# Patient Record
Sex: FEMALE | Race: ASIAN | NOT HISPANIC OR LATINO | Employment: FULL TIME | URBAN - METROPOLITAN AREA
[De-identification: names, ages, dates, MRNs, and addresses within clinical notes are randomized per-mention and may not be internally consistent; named-entity substitution may affect disease eponyms.]

---

## 2016-10-03 LAB
HBV SURFACE AG SERPL QL IA: POSITIVE
HIV 1+2 AB+HIV1 P24 AG SERPL QL IA: NONREACTIVE
RUBELLA ANTIBODY IGG QUANTITATIVE: NORMAL IU/ML
T PALLIDUM IGG SER QL: NONREACTIVE

## 2017-04-03 LAB — GROUP B STREP PCR: NEGATIVE

## 2017-04-28 ENCOUNTER — HOSPITAL ENCOUNTER (OUTPATIENT)
Facility: CLINIC | Age: 37
Discharge: HOME | End: 2017-04-28
Attending: OBSTETRICS & GYNECOLOGY | Admitting: OBSTETRICS & GYNECOLOGY
Payer: COMMERCIAL

## 2017-04-28 VITALS
HEIGHT: 57 IN | SYSTOLIC BLOOD PRESSURE: 115 MMHG | WEIGHT: 141 LBS | BODY MASS INDEX: 30.42 KG/M2 | RESPIRATION RATE: 16 BRPM | TEMPERATURE: 98.1 F | DIASTOLIC BLOOD PRESSURE: 79 MMHG

## 2017-04-28 PROBLEM — Z36.89 ENCOUNTER FOR TRIAGE IN PREGNANT PATIENT: Status: ACTIVE | Noted: 2017-04-28

## 2017-04-28 PROCEDURE — 99213 OFFICE O/P EST LOW 20 MIN: CPT

## 2017-04-28 PROCEDURE — 99214 OFFICE O/P EST MOD 30 MIN: CPT

## 2017-04-28 RX ORDER — PRENATAL VIT/IRON FUM/FOLIC AC 27MG-0.8MG
1 TABLET ORAL DAILY
COMMUNITY

## 2017-04-28 NOTE — PROVIDER NOTIFICATION
04/28/17 0858   Provider Notification   Provider Name/Title Dr Kulkarni   Method of Notification In Department   Notification Reason SVE;Status Update   early labor, orders to d/c home

## 2017-04-28 NOTE — PROVIDER NOTIFICATION
04/28/17 0829   Provider Notification   Provider Name/Title Dr Kulkarni   Method of Notification In Department   viewing strip

## 2017-04-28 NOTE — PROVIDER NOTIFICATION
04/28/17 0716   Provider Notification   Provider Name/Title Dr. Kulkarni   Method of Notification Phone   Request Evaluate - Remote   Notification Reason Patient Arrived;Uterine Activity;Pain;Bleeding;Lab/Diagnostic Study;SVE;Other (Comment)   Notified Dr. Kulkarni of pt arrival, c/o, uterine activity, SVE, pt description of pain, hepatitis B positive.  Orders received for pt to ambulate for one hour and recheck cervix.

## 2017-04-28 NOTE — PLAN OF CARE
"At 0634,  at 39 1/7 weeks presented to L&D with c/o contractions q 15 minutes beginning this morning and red bleeding when she wiped x 1 at 0400, with no bleeding since.  Pt denies LOF.  Pt's primary language is Cambodian.  Pt declines telephone  at this time, but states that she would desire bedside  if she is admitted for labor.  EFM applied and history reviewed.    History significant for Hepatitis B.  Per prenatal record, viral load is low.    Irregular contractions observed, mild to moderate to palpation.  Pt describes some contractions as feeling \"tight\", and others as feeling painful.  Cervix 1/80/-1, scant brown discharge on glove with exam.  No fluid or other bleeding observed.    Plan to update MD for further orders.  "

## 2017-04-28 NOTE — PROGRESS NOTES
Data: [facility] at 0600.   Reason for maternal/fetal assessment per patient is Vaginal Bleeding and Contractions  . Patient is a . Prenatal record reviewed.      Obstetric History       T0      TAB0   SAB0   E0   M0   L0       # Outcome Date GA Lbr Hernan/2nd Weight Sex Delivery Anes PTL Lv   1 Current                  Medical History:   Past Medical History:   Diagnosis Date     Hepatitis B      Horseshoe kidney    . Gestational Age 39w1d. VSS. Cervix: /-1.  Fetal movement present. Patient denies cramping, backache, vaginal discharge, pelvic pressure, UTI symptoms, GI problems, edema, headache, visual disturbances, epigastric or URQ pain, abdominal pain, rupture of membranes. Support persons present.  Action: Verbal consent for EFM. Triage assessment completed. EFM applied for fetal well being. Uterine assessment performed. Fetal assessment: Presumed adequate fetal oxygenation documented (see flow record).  Patient instructed to report change in fetal movement, vaginal leaking of fluid or bleeding, abdominal pain, or any concerns related to the pregnancy to her nurse/physician.   Response: Dr. Kulkarni informed of caden alonso. Plan per provider is discharge home. Patient verbalized understanding of education and verbalized agreement with plan. Discharged ambulatory at 0915.

## 2017-04-28 NOTE — DISCHARGE INSTRUCTIONS
Discharge Instruction for Undelivered Patients      You were seen for: Labor Assessment  We Consulted: Dr Kuklarni  You had (Test or Medicine):sve and fetal monitoring     Diet:   Drink 8 to 12 glasses of liquids (milk, juice, water) every day.  You may eat meals and snacks.     Activity:  Call your doctor or nurse midwife if your baby is moving less than usual.     Call your provider if you notice:  Swelling in your face or increased swelling in your hands or legs.  Headaches that are not relieved by Tylenol (acetaminophen).  Changes in your vision (blurring: seeing spots or stars.)  Nausea (sick to your stomach) and vomiting (throwing up).   Weight gain of 5 pounds or more per week.  Heartburn that doesn't go away.  Signs of bladder infection: pain when you urinate (use the toilet), need to go more often and more urgently.  The bag of alvarez (rupture of membranes) breaks, or you notice leaking in your underwear.  Bright red blood in your underwear.  Abdominal (lower belly) or stomach pain.  For first baby: Contractions (tightening) less than 5 minutes apart for one hour or more.  Increase or change in vaginal discharge (note the color and amount)      Follow-up:  As scheduled in the clinic

## 2017-04-28 NOTE — IP AVS SNAPSHOT
MRN:[id number]                      After Visit Summary   4/28/2017    Magaly Jensen    MRN: [id number]           Thank you!     Thank you for choosing [facility] for your care. Our goal is always to provide you with excellent care. Hearing back from our patients is one way we can continue to improve our services. Please take a few minutes to complete the written survey that you may receive in the mail after you visit. If you would like to speak to someone directly about your visit please contact Patient Relations at [phone]. Thank you!          Patient Information     Date Of Birth          1980        About your hospital stay     You were admitted on:  April 28, 2017 You last received care in the:  [facility] Labor and Delivery    You were discharged on:  April 28, 2017       Who to Call     For medical emergencies, please call 911.  For non-urgent questions about your medical care, please call your primary care provider or clinic, None          Attending Provider     Provider Specialty    Gabriela Damico MD OB/Gyn    Olivia Kulkarni,  OB/Gyn       Primary Care Provider    None Specified       No primary provider on file.        Further instructions from your care team       Discharge Instruction for Undelivered Patients      You were seen for: Labor Assessment  We Consulted: Dr Kulkarni  You had (Test or Medicine):sve and fetal monitoring     Diet:   Drink 8 to 12 glasses of liquids (milk, juice, water) every day.  You may eat meals and snacks.     Activity:  Call your doctor or nurse midwife if your baby is moving less than usual.     Call your provider if you notice:  Swelling in your face or increased swelling in your hands or legs.  Headaches that are not relieved by Tylenol (acetaminophen).  Changes in your vision (blurring: seeing spots or stars.)  Nausea (sick to your stomach) and vomiting (throwing up).   Weight gain of 5 pounds or more per  "week.  Heartburn that doesn't go away.  Signs of bladder infection: pain when you urinate (use the toilet), need to go more often and more urgently.  The bag of alvarez (rupture of membranes) breaks, or you notice leaking in your underwear.  Bright red blood in your underwear.  Abdominal (lower belly) or stomach pain.  For first baby: Contractions (tightening) less than 5 minutes apart for one hour or more.  Increase or change in vaginal discharge (note the color and amount)      Follow-up:  As scheduled in the clinic          Pending Results     No orders found from 2017 to 2017.            Admission Information     Date & Time Provider Department Dept. Phone    2017 Olivia Kulkarni,  [facility] Labor and Delivery [phone]      Your Vitals Were     Blood Pressure Temperature Respirations Height Weight BMI (Body Mass Index)    115/79 98.1  F (36.7  C) (Oral) 16 1.448 m (4' 9\") 64 kg (141 lb) 30.51 kg/m2      MyChart Information     [employer] lets you send messages to your doctor, view your test results, renew your prescriptions, schedule appointments and more. To sign up, go to [email or web address] . Click on \"Log in\" on the left side of the screen, which will take you to the Welcome page. Then click on \"Sign up Now\" on the right side of the page.     You will be asked to enter the access code listed below, as well as some personal information. Please follow the directions to create your username and password.     Your access code is: [email or web address]  Expires: 2017  8:56 AM     Your access code will  in 90 days. If you need help or a new code, please call your [place] clinic or [phone].        Care EveryWhere ID     This is your Care EveryWhere ID. This could be used by other organizations to access your [place] medical records  [id number]           Review of your medicines      UNREVIEWED medicines. Ask your doctor about these medicines        Dose / Directions    " prenatal multivitamin  plus iron 27-0.8 MG Tabs per tablet        Dose:  1 tablet   Take 1 tablet by mouth daily   Refills:  0                Protect others around you: Learn how to safely use, store and throw away your medicines at www.disposemymeds.org.             Medication List: This is a list of all your medications and when to take them. Check marks below indicate your daily home schedule. Keep this list as a reference.      Medications           Morning Afternoon Evening Bedtime As Needed    prenatal multivitamin  plus iron 27-0.8 MG Tabs per tablet   Take 1 tablet by mouth daily

## 2017-04-29 ENCOUNTER — HOSPITAL ENCOUNTER (OUTPATIENT)
Facility: CLINIC | Age: 37
Discharge: HOME | End: 2017-04-29
Attending: OBSTETRICS & GYNECOLOGY | Admitting: OBSTETRICS & GYNECOLOGY
Payer: COMMERCIAL

## 2017-04-29 ENCOUNTER — HOSPITAL ENCOUNTER (INPATIENT)
Facility: CLINIC | Age: 37
LOS: 2 days | Discharge: HOME | End: 2017-05-01
Attending: OBSTETRICS & GYNECOLOGY | Admitting: OBSTETRICS & GYNECOLOGY
Payer: COMMERCIAL

## 2017-04-29 VITALS — RESPIRATION RATE: 16 BRPM | DIASTOLIC BLOOD PRESSURE: 76 MMHG | TEMPERATURE: 98.3 F | SYSTOLIC BLOOD PRESSURE: 131 MMHG

## 2017-04-29 LAB
ABO + RH BLD: NORMAL
ABO + RH BLD: NORMAL
SPECIMEN EXP DATE BLD: NORMAL

## 2017-04-29 PROCEDURE — 25000125 ZZHC RX 250: Performed by: OBSTETRICS & GYNECOLOGY

## 2017-04-29 PROCEDURE — 12000029 ZZH R&B OB INTERMEDIATE

## 2017-04-29 PROCEDURE — 86900 BLOOD TYPING SEROLOGIC ABO: CPT | Performed by: OBSTETRICS & GYNECOLOGY

## 2017-04-29 PROCEDURE — 86901 BLOOD TYPING SEROLOGIC RH(D): CPT | Performed by: OBSTETRICS & GYNECOLOGY

## 2017-04-29 PROCEDURE — 25000132 ZZH RX MED GY IP 250 OP 250 PS 637: Performed by: OBSTETRICS & GYNECOLOGY

## 2017-04-29 PROCEDURE — 86780 TREPONEMA PALLIDUM: CPT | Performed by: OBSTETRICS & GYNECOLOGY

## 2017-04-29 PROCEDURE — 0KQM0ZZ REPAIR PERINEUM MUSCLE, OPEN APPROACH: ICD-10-PCS | Performed by: OBSTETRICS & GYNECOLOGY

## 2017-04-29 PROCEDURE — 36415 COLL VENOUS BLD VENIPUNCTURE: CPT | Performed by: OBSTETRICS & GYNECOLOGY

## 2017-04-29 PROCEDURE — 25800025 ZZH RX 258: Performed by: OBSTETRICS & GYNECOLOGY

## 2017-04-29 PROCEDURE — 72200001 ZZH LABOR CARE VAGINAL DELIVERY SINGLE

## 2017-04-29 RX ORDER — NALOXONE HYDROCHLORIDE 0.4 MG/ML
.1-.4 INJECTION, SOLUTION INTRAMUSCULAR; INTRAVENOUS; SUBCUTANEOUS
Status: DISCONTINUED | OUTPATIENT
Start: 2017-04-29 | End: 2017-04-30 | Stop reason: CLARIF

## 2017-04-29 RX ORDER — OXYTOCIN/0.9 % SODIUM CHLORIDE 30/500 ML
100-340 PLASTIC BAG, INJECTION (ML) INTRAVENOUS CONTINUOUS PRN
Status: COMPLETED | OUTPATIENT
Start: 2017-04-29 | End: 2017-04-29

## 2017-04-29 RX ORDER — METHYLERGONOVINE MALEATE 0.2 MG/ML
200 INJECTION INTRAVENOUS
Status: DISCONTINUED | OUTPATIENT
Start: 2017-04-29 | End: 2017-04-30 | Stop reason: CLARIF

## 2017-04-29 RX ORDER — IBUPROFEN 400 MG/1
400-800 TABLET, FILM COATED ORAL EVERY 6 HOURS PRN
Status: DISCONTINUED | OUTPATIENT
Start: 2017-04-29 | End: 2017-05-01 | Stop reason: HOSPADM

## 2017-04-29 RX ORDER — FENTANYL CITRATE 50 UG/ML
50-100 INJECTION, SOLUTION INTRAMUSCULAR; INTRAVENOUS
Status: DISCONTINUED | OUTPATIENT
Start: 2017-04-29 | End: 2017-04-30 | Stop reason: CLARIF

## 2017-04-29 RX ORDER — ONDANSETRON 2 MG/ML
4 INJECTION INTRAMUSCULAR; INTRAVENOUS EVERY 6 HOURS PRN
Status: DISCONTINUED | OUTPATIENT
Start: 2017-04-29 | End: 2017-04-30 | Stop reason: CLARIF

## 2017-04-29 RX ORDER — MISOPROSTOL 200 UG/1
400 TABLET ORAL
Status: DISCONTINUED | OUTPATIENT
Start: 2017-04-29 | End: 2017-05-01 | Stop reason: HOSPADM

## 2017-04-29 RX ORDER — IBUPROFEN 800 MG/1
800 TABLET, FILM COATED ORAL
Status: COMPLETED | OUTPATIENT
Start: 2017-04-29 | End: 2017-04-29

## 2017-04-29 RX ORDER — OXYCODONE AND ACETAMINOPHEN 5; 325 MG/1; MG/1
1 TABLET ORAL
Status: DISCONTINUED | OUTPATIENT
Start: 2017-04-29 | End: 2017-04-30 | Stop reason: CLARIF

## 2017-04-29 RX ORDER — AMOXICILLIN 250 MG
1-2 CAPSULE ORAL 2 TIMES DAILY
Status: DISCONTINUED | OUTPATIENT
Start: 2017-04-29 | End: 2017-05-01 | Stop reason: HOSPADM

## 2017-04-29 RX ORDER — OXYTOCIN/0.9 % SODIUM CHLORIDE 30/500 ML
100 PLASTIC BAG, INJECTION (ML) INTRAVENOUS CONTINUOUS
Status: DISCONTINUED | OUTPATIENT
Start: 2017-04-29 | End: 2017-05-01 | Stop reason: HOSPADM

## 2017-04-29 RX ORDER — CARBOPROST TROMETHAMINE 250 UG/ML
250 INJECTION, SOLUTION INTRAMUSCULAR
Status: DISCONTINUED | OUTPATIENT
Start: 2017-04-29 | End: 2017-04-30 | Stop reason: CLARIF

## 2017-04-29 RX ORDER — LANOLIN 100 %
OINTMENT (GRAM) TOPICAL
Status: DISCONTINUED | OUTPATIENT
Start: 2017-04-29 | End: 2017-05-01 | Stop reason: HOSPADM

## 2017-04-29 RX ORDER — HYDROCORTISONE 2.5 %
CREAM (GRAM) TOPICAL 3 TIMES DAILY PRN
Status: DISCONTINUED | OUTPATIENT
Start: 2017-04-29 | End: 2017-05-01 | Stop reason: HOSPADM

## 2017-04-29 RX ORDER — OXYTOCIN 10 [USP'U]/ML
10 INJECTION, SOLUTION INTRAMUSCULAR; INTRAVENOUS
Status: DISCONTINUED | OUTPATIENT
Start: 2017-04-29 | End: 2017-04-30 | Stop reason: CLARIF

## 2017-04-29 RX ORDER — NALOXONE HYDROCHLORIDE 0.4 MG/ML
.1-.4 INJECTION, SOLUTION INTRAMUSCULAR; INTRAVENOUS; SUBCUTANEOUS
Status: DISCONTINUED | OUTPATIENT
Start: 2017-04-29 | End: 2017-05-01 | Stop reason: HOSPADM

## 2017-04-29 RX ORDER — ACETAMINOPHEN 325 MG/1
650 TABLET ORAL EVERY 4 HOURS PRN
Status: DISCONTINUED | OUTPATIENT
Start: 2017-04-29 | End: 2017-05-01 | Stop reason: HOSPADM

## 2017-04-29 RX ORDER — OXYCODONE HYDROCHLORIDE 5 MG/1
5-10 TABLET ORAL
Status: DISCONTINUED | OUTPATIENT
Start: 2017-04-29 | End: 2017-05-01 | Stop reason: HOSPADM

## 2017-04-29 RX ORDER — OXYTOCIN/0.9 % SODIUM CHLORIDE 30/500 ML
340 PLASTIC BAG, INJECTION (ML) INTRAVENOUS CONTINUOUS PRN
Status: DISCONTINUED | OUTPATIENT
Start: 2017-04-29 | End: 2017-05-01 | Stop reason: HOSPADM

## 2017-04-29 RX ORDER — ACETAMINOPHEN 325 MG/1
650 TABLET ORAL EVERY 4 HOURS PRN
Status: DISCONTINUED | OUTPATIENT
Start: 2017-04-29 | End: 2017-04-30 | Stop reason: CLARIF

## 2017-04-29 RX ORDER — OXYTOCIN 10 [USP'U]/ML
10 INJECTION, SOLUTION INTRAMUSCULAR; INTRAVENOUS
Status: DISCONTINUED | OUTPATIENT
Start: 2017-04-29 | End: 2017-05-01 | Stop reason: HOSPADM

## 2017-04-29 RX ORDER — SODIUM CHLORIDE, SODIUM LACTATE, POTASSIUM CHLORIDE, CALCIUM CHLORIDE 600; 310; 30; 20 MG/100ML; MG/100ML; MG/100ML; MG/100ML
INJECTION, SOLUTION INTRAVENOUS CONTINUOUS
Status: DISCONTINUED | OUTPATIENT
Start: 2017-04-29 | End: 2017-04-30 | Stop reason: CLARIF

## 2017-04-29 RX ORDER — BISACODYL 10 MG
10 SUPPOSITORY, RECTAL RECTAL DAILY PRN
Status: DISCONTINUED | OUTPATIENT
Start: 2017-05-01 | End: 2017-05-01 | Stop reason: HOSPADM

## 2017-04-29 RX ORDER — HYDROMORPHONE HYDROCHLORIDE 1 MG/ML
.3-.5 INJECTION, SOLUTION INTRAMUSCULAR; INTRAVENOUS; SUBCUTANEOUS EVERY 30 MIN PRN
Status: DISCONTINUED | OUTPATIENT
Start: 2017-04-29 | End: 2017-05-01 | Stop reason: HOSPADM

## 2017-04-29 RX ADMIN — SENNOSIDES AND DOCUSATE SODIUM 1 TABLET: 8.6; 5 TABLET ORAL at 21:05

## 2017-04-29 RX ADMIN — SODIUM CHLORIDE, POTASSIUM CHLORIDE, SODIUM LACTATE AND CALCIUM CHLORIDE: 600; 310; 30; 20 INJECTION, SOLUTION INTRAVENOUS at 07:56

## 2017-04-29 RX ADMIN — OXYTOCIN-SODIUM CHLORIDE 0.9% IV SOLN 30 UNIT/500ML 340 ML/HR: 30-0.9/5 SOLUTION at 11:23

## 2017-04-29 RX ADMIN — LIDOCAINE HYDROCHLORIDE 15 ML: 10 INJECTION, SOLUTION INFILTRATION; PERINEURAL at 11:27

## 2017-04-29 RX ADMIN — IBUPROFEN 800 MG: 800 TABLET ORAL at 13:36

## 2017-04-29 NOTE — IP AVS SNAPSHOT
MRN:[id number]                      After Visit Summary   4/29/2017    Magaly Jensen    MRN: [id number]           Thank you!     Thank you for choosing [facility] for your care. Our goal is always to provide you with excellent care. Hearing back from our patients is one way we can continue to improve our services. Please take a few minutes to complete the written survey that you may receive in the mail after you visit. If you would like to speak to someone directly about your visit please contact Patient Relations at [phone]. Thank you!          Patient Information     Date Of Birth          1980        About your hospital stay     You were admitted on:  April 29, 2017 You last received care in the:  [facility] Labor and Delivery    You were discharged on:  April 29, 2017       Who to Call     For medical emergencies, please call 911.  For non-urgent questions about your medical care, please call your primary care provider or clinic, None          Attending Provider     Provider Specialty    Olivia Kulkarni, DO OB/Gyn       Primary Care Provider    None Specified       No primary provider on file.        Further instructions from your care team       Discharge Instruction for Undelivered Patients      You were seen for: rule out labor  We Consulted: Dr. Kulkarni  You had (Test or Medicine):fetal monitoring     Diet:   Drink 8 to 12 glasses of liquids (milk, juice, water) every day.  You may eat meals and snacks.     Activity:  Call your doctor or nurse midwife if your baby is moving less than usual.     Call your provider if you notice:  Swelling in your face or increased swelling in your hands or legs.  Headaches that are not relieved by Tylenol (acetaminophen).  Changes in your vision (blurring: seeing spots or stars.)  Nausea (sick to your stomach) and vomiting (throwing up).   Weight gain of 5 pounds or more per week.  Heartburn that doesn't go away.  Signs of  "bladder infection: pain when you urinate (use the toilet), need to go more often and more urgently.  The bag of alvarez (rupture of membranes) breaks, or you notice leaking in your underwear.  Bright red blood in your underwear.  Abdominal (lower belly) or stomach pain.  For first baby: Contractions (tightening) less than 5 minutes apart for one hour or more.         Follow-up:  As scheduled in the clinic          Pending Results     No orders found from 2017 to 2017.            Admission Information     Date & Time Provider Department Dept. Phone    2017 Olivia Kulkarni, DO [facility] Labor and Delivery [phone]      Your Vitals Were     Blood Pressure Temperature Respirations             131/76 98.3  F (36.8  C) (Oral) 16         MyChart Information     [employer] lets you send messages to your doctor, view your test results, renew your prescriptions, schedule appointments and more. To sign up, go to [email or web address] . Click on \"Log in\" on the left side of the screen, which will take you to the Welcome page. Then click on \"Sign up Now\" on the right side of the page.     You will be asked to enter the access code listed below, as well as some personal information. Please follow the directions to create your username and password.     Your access code is: [email or web address]  Expires: 2017  8:56 AM     Your access code will  in 90 days. If you need help or a new code, please call your [place] clinic or [phone].        Care EveryWhere ID     This is your Care EveryWhere ID. This could be used by other organizations to access your [place] medical records  [id number]           Review of your medicines      UNREVIEWED medicines. Ask your doctor about these medicines        Dose / Directions    prenatal multivitamin  plus iron 27-0.8 MG Tabs per tablet        Dose:  1 tablet   Take 1 tablet by mouth daily   Refills:  0                Protect others around you: Learn how to " safely use, store and throw away your medicines at www.disposemymeds.org.             Medication List: This is a list of all your medications and when to take them. Check marks below indicate your daily home schedule. Keep this list as a reference.      Medications           Morning Afternoon Evening Bedtime As Needed    prenatal multivitamin  plus iron 27-0.8 MG Tabs per tablet   Take 1 tablet by mouth daily

## 2017-04-29 NOTE — PROVIDER NOTIFICATION
04/29/17 0900   Provider Notification   Provider Name/Title Dr Pena   Method of Notification In Department   Updated on SVE, intact, going natural. No new orders received.

## 2017-04-29 NOTE — IP AVS SNAPSHOT
MRN:[id number]                      After Visit Summary   4/29/2017    Magaly Jensen    MRN: [id number]           Thank you!     Thank you for choosing [facility] for your care. Our goal is always to provide you with excellent care. Hearing back from our patients is one way we can continue to improve our services. Please take a few minutes to complete the written survey that you may receive in the mail after you visit. If you would like to speak to someone directly about your visit please contact Patient Relations at [phone]. Thank you!          Patient Information     Date Of Birth          1980        Designated Caregiver       Most Recent Value    Caregiver    Will someone help with your care after discharge? no      About your hospital stay     You were admitted on:  April 29, 2017 You last received care in the:  [facility] Postpartum    You were discharged on:  May 1, 2017       Who to Call     For medical emergencies, please call 911.  For non-urgent questions about your medical care, please call your primary care provider or clinic, [phone]          Attending Provider     Provider Specialty    Marcus Pena MD OB/Gyn       Primary Care Provider Office Phone # Fax #    Yadi Velasco [phone]       [facility] [place] [address] [place] [address]        After Care Instructions     Activity       Review discharge instructions            Activity       Review discharge instructions            Diet       Resume previous diet            Diet       Resume previous diet            Discharge Instructions - Postpartum visit       Schedule postpartum visit with your provider and return to clinic in 6 weeks.            Discharge Instructions - Postpartum visit       Schedule postpartum visit with your provider and return to clinic in 6 weeks.  She will also follow-up in the office in 48 hours secondary to  urinary retention and Gallego bag post-delivery.  An appointment was made with Dr. Miranda at 11:20 on 5/3/2017.                  Further instructions from your care team         Gallego Catheter Care    A Gallego catheter is a rubber tube that is placed through the urethra (opening where urine comes out) and into the bladder. This helps drain urine from the bladder. There is a small balloon on the end of the tube that is inflated after insertion. This keeps the catheter from sliding out of the bladder.  A Gallego catheter is used to treat urinary retention (unable to pass urine). It is also used when there is incontinence (loss of bladder control).  Home care  Finish taking any prescribed antibiotic even if you are feeling better before then.  It is important to keep bacteria from getting into the collection bag. Do not disconnect the catheter from the collection bag.  Use a leg band to secure the drainage tube, so it does not pull on the catheter. Drain the collection bag when it becomes full using the drain spout at the bottom of the bag.  Do not try to pull or remove your catheter. This will injure your urethra. It must be removed by a doctor or nurse.  Follow-up care  Follow up with your doctor as advised for repeat urine testing and catheter removal or replacement.  When to seek medical care  Get prompt medical attention if any of the following occur:  Fever of 100.4 F (38 C) or higher, or as directed by your health care provider  Bladder pain or fullness  Abdominal swelling, nausea or vomiting or back pain  Blood or urine leakage around the catheter  Bloody urine coming from the catheter (if a new symptom)  Catheter falls out  Catheter stops draining for 6 hours  Weakness, dizziness or fainting    8797-6747 [employer]. [address] [place] [address], [place] [address]. All rights reserved. This information is not intended as a substitute for professional medical care. Always follow your healthcare  professional's instructions.      Postpartum Vaginal Delivery Instructions    Activity       Ask family and friends for help when you need it.    Do not place anything in your vagina for 6 weeks.    You are not restricted on other activities, but take it easy for a few weeks to allow your body to recover from delivery.  You are able to do any activities you feel up to that point.    No driving until you have stopped taking your pain medications (usually two weeks after delivery).     Call your health care provider if you have any of these symptoms:       Increased pain, swelling, redness, or fluid around your stiches from an episiotomy or perineal tear.    A fever above 100.4 F (38 C) with or without chills when placing a thermometer under your tongue.    You soak a sanitary pad with blood within 1 hour, or you see blood clots larger than a golf ball.    Bleeding that lasts more than 6 weeks.    Vaginal discharge that smells bad.    Severe pain, cramping or tenderness in your lower belly area.    A need to urinate more frequently (use the toilet more often), more urgently (use the toilet very quickly), or it burns when you urinate.    Nausea and vomiting.    Redness, swelling or pain around a vein in your leg.    Problems breastfeeding or a red or painful area on your breast.    Chest pain and cough or are gasping for air.    Problems coping with sadness, anxiety, or depression.  If you have any concerns about hurting yourself or the baby, call your provider immediately.     You have questions or concerns after you return home.        TO CLINIC ON MAY 3@ 1120 WITH DR. COLVIN FOR MISTRY REMOVAL.  Keep your hands clean:  Always wash your hands before touching your perineal area and stitches.  This helps reduce your risk of infection.  If your hands aren't dirty, you may use an alcohol hand-rub to clean your hands. Keep your nails clean and short.        Pending Results     No orders found from 4/27/2017 to 4/30/2017.  "           Statement of Approval     Ordered          17 1717  I have reviewed and agree with all the recommendations and orders detailed in this document.  EFFECTIVE NOW     Approved and electronically signed by:  Olivia Kulkarni DO             Admission Information     Date & Time Provider Department Dept. Phone    2017 Marcus Pena MD [facility] Postpartum [phone]      Your Vitals Were     Blood Pressure Pulse Temperature Respirations          124/65 108 98.9  F (37.2  C) (Oral) 16        MyChart Information     [employer] lets you send messages to your doctor, view your test results, renew your prescriptions, schedule appointments and more. To sign up, go to [email or web address] . Click on \"Log in\" on the left side of the screen, which will take you to the Welcome page. Then click on \"Sign up Now\" on the right side of the page.     You will be asked to enter the access code listed below, as well as some personal information. Please follow the directions to create your username and password.     Your access code is: [email or web address]  Expires: 2017  8:56 AM     Your access code will  in 90 days. If you need help or a new code, please call your [place] clinic or [phone].        Care EveryWhere ID     This is your Care EveryWhere ID. This could be used by other organizations to access your [place] medical records  [id number]           Review of your medicines      START taking        Dose / Directions    breast pump Misc   Used for:  Breast feeding status of mother        Dose:  1 each   1 each as needed   Quantity:  1 each   Refills:  0       ibuprofen 400 MG tablet   Commonly known as:  ADVIL/MOTRIN   Used for:   (spontaneous vaginal delivery)        Dose:  400-800 mg   Take 1-2 tablets (400-800 mg) by mouth every 6 hours as needed for other (cramping)   Quantity:  60 tablet   Refills:  0       senna-docusate 8.6-50 MG per tablet   Commonly known as:  " SENOKOT-S;PERICOLACE   Used for:   (spontaneous vaginal delivery)        Dose:  1-2 tablet   Take 1-2 tablets by mouth 2 times daily   Quantity:  60 tablet   Refills:  0         CONTINUE these medicines which have NOT CHANGED        Dose / Directions    prenatal multivitamin  plus iron 27-0.8 MG Tabs per tablet        Dose:  1 tablet   Take 1 tablet by mouth daily   Refills:  0            Where to get your medicines      Some of these will need a paper prescription and others can be bought over the counter. Ask your nurse if you have questions.     Bring a paper prescription for each of these medications     breast pump Misc    ibuprofen 400 MG tablet    senna-docusate 8.6-50 MG per tablet                Protect others around you: Learn how to safely use, store and throw away your medicines at www.disposemymeds.org.             Medication List: This is a list of all your medications and when to take them. Check marks below indicate your daily home schedule. Keep this list as a reference.      Medications           Morning Afternoon Evening Bedtime As Needed    breast pump Misc   1 each as needed                                ibuprofen 400 MG tablet   Commonly known as:  ADVIL/MOTRIN   Take 1-2 tablets (400-800 mg) by mouth every 6 hours as needed for other (cramping)   Last time this was given:  800 mg on 2017  2:00 PM                                prenatal multivitamin  plus iron 27-0.8 MG Tabs per tablet   Take 1 tablet by mouth daily                                senna-docusate 8.6-50 MG per tablet   Commonly known as:  SENOKOT-S;PERICOLACE   Take 1-2 tablets by mouth 2 times daily   Last time this was given:  1 tablet on 2017  9:28 PM

## 2017-04-29 NOTE — IP AVS SNAPSHOT
[facility]    [address]    [facility] [address]    Phone:  [phone]    Fax:  [phone]                                       After Visit Summary   4/29/2017    Magaly Jensen    MRN: [id number]           After Visit Summary Signature Page     I have received my discharge instructions, and my questions have been answered. I have discussed any challenges I see with this plan with the nurse or doctor.    ..........................................................................................................................................  Patient/Patient Representative Signature      ..........................................................................................................................................  Patient Representative Print Name and Relationship to Patient    ..................................................               ................................................  Date                                            Time    ..........................................................................................................................................  Reviewed by Signature/Title    ...................................................              ..............................................  Date                                                            Time

## 2017-04-29 NOTE — IP AVS SNAPSHOT
[facility] Labor and Delivery    [address]    [facility] [address]    Phone:  [phone]    Fax:  [phone]                                       After Visit Summary   4/29/2017    Magaly Jensen    MRN: [id number]           After Visit Summary Signature Page     I have received my discharge instructions, and my questions have been answered. I have discussed any challenges I see with this plan with the nurse or doctor.    ..........................................................................................................................................  Patient/Patient Representative Signature      ..........................................................................................................................................  Patient Representative Print Name and Relationship to Patient    ..................................................               ................................................  Date                                            Time    ..........................................................................................................................................  Reviewed by Signature/Title    ...................................................              ..............................................  Date                                                            Time

## 2017-04-29 NOTE — H&P
No significant change in general health status based on exam of the patient, review of Nursing database and prenatal.  35yo  presents in spontaneous labor.  GBS neg.  Pregnancy complicated by +HepB w/o clinical illness.    Monitor progress, anticipate .

## 2017-04-29 NOTE — PROGRESS NOTES
Data: Magaly Jensen transferred to [facility] via wheelchair at 1415. Baby transferred in mother's arms.  Action: Receiving unit notified of transfer: Yes. Patient and family notified of room change. Report given to KIRSTEN Faria at 1510. Belongings sent to receiving unit. Accompanied by Registered Nurse. Oriented patient to surroundings with  present. Call light within reach. ID bands double-checked with KISRTEN Maynard.  Response: Patient tolerated transfer and is stable.

## 2017-04-29 NOTE — PROVIDER NOTIFICATION
04/29/17 0759   Provider Notification   Provider Name/Title Dr. Pena   Method of Notification In Department   Request Evaluate in Person   Notification Reason Patient Arrived;SVE;Labor Status   MD updated on patient's arrival, SVE 3/100/-1, BBOW, bloody show, contraction pattern, and Hepatitis B positive. Orders to admit patient.

## 2017-04-29 NOTE — PROGRESS NOTES
39.2 weeks back to labor and delivery for labor evaluation. External monitors applied and explained. Patient left around 0400 with cervix unchanged. States when she went home her contractions were unmanageable so she came back. Denies leaking of fluid or vaginal bleeding at this time. All triage admission and health history reviewed with no changes since last admission. SVE 3100/-1 with a bulging bag. Bloody show noted with exam. Will update OB.   Patient does speak some English, primary language is Cambodian.  services called.

## 2017-04-29 NOTE — PROVIDER NOTIFICATION
17 0339   Provider Notification   Provider Name/Title Dr. Kulkarni   Method of Notification Phone   Request Evaluate - Remote   Notification Reason Patient Arrived;Pain;Status Update;Uterine Activity;SVE     Dr. Kulkarni informed  here for rule out labor. SVE /-1, unchanged from previous check. Contractions 2-6 minutes part, category 1 tracing. Patient rating contraction pain 10/10. Patient is able to walk through contractions and is smiling and talking to family. Dr. Reyes ok to d/c patient to home.

## 2017-04-29 NOTE — L&D DELIVERY NOTE
Magaly Jensen is a 36 year old-year-old Cambodian female,  1 para 0 with LMP 16 and EDC 17, who was admitted for spontaneous laboir at 39 weeks gestation.    Her prenatal care was at the [facility] in [place]. Prenatal course was complicated by Hep B carrier status, no clinical symptoms. Vaginal Group B Streptococcus culture was negative.  Patient experienced spontaneous rupture of membranes at 0945, yielding clear fluid.  She progressed spontaneously and quickly declining labor analgesia  The patient achieved complete dilation at 1103. She went on to deliver a male infant at 1119 by . Apgars were  9 at one minute and 9 at five minutes. The fetal oropharynx was bulb suctioned on the perineum.  There was no nuchal cord. The placenta delivered spontaneously and intact at 1123.  The patient had a 2nd degree perineal laceration. This was repaired with 2-0 and 3-0 vicryl sutures.  QBL was recorded as 1179cc but 600-700 cc of this was urine as the bladder was drained into the collection pouch by straight cath after noting  A distended bladder when performing fundal checks  Duration of the first stage of labor was 6 hrs and 3 minutes, second stage 16 minutes, and third stage 4 minutes.  The patient has elected to Breastfeed her infant.  Dr. Hilario Pena

## 2017-04-29 NOTE — DISCHARGE INSTRUCTIONS
Discharge Instruction for Undelivered Patients      You were seen for: rule out labor  We Consulted: Dr. Kulkarni  You had (Test or Medicine):fetal monitoring     Diet:   Drink 8 to 12 glasses of liquids (milk, juice, water) every day.  You may eat meals and snacks.     Activity:  Call your doctor or nurse midwife if your baby is moving less than usual.     Call your provider if you notice:  Swelling in your face or increased swelling in your hands or legs.  Headaches that are not relieved by Tylenol (acetaminophen).  Changes in your vision (blurring: seeing spots or stars.)  Nausea (sick to your stomach) and vomiting (throwing up).   Weight gain of 5 pounds or more per week.  Heartburn that doesn't go away.  Signs of bladder infection: pain when you urinate (use the toilet), need to go more often and more urgently.  The bag of alvarez (rupture of membranes) breaks, or you notice leaking in your underwear.  Bright red blood in your underwear.  Abdominal (lower belly) or stomach pain.  For first baby: Contractions (tightening) less than 5 minutes apart for one hour or more.         Follow-up:  As scheduled in the clinic

## 2017-04-29 NOTE — PLAN OF CARE
Data: [facility] for [facility] out labor.  Cervical exam 1/80/-1, unchanged from previous visit.  Membranes intact.  Contractions 2-6 minutes.  Action:  Presumed adequate fetal oxygenation documented (see flow record). Discharge instructions reviewed.  Patient instructed to report change in fetal movement, vaginal leaking of fluid or bleeding, abdominal pain, or any concerns related to the pregnancy to her nurse/physician.   Response: Orders to discharge home per Dr. Kulkarni.  Patient verbalized understanding of education and verbalized agreement with plan.

## 2017-04-30 LAB
HGB BLD-MCNC: 10.6 G/DL (ref 11.7–15.7)
T PALLIDUM IGG+IGM SER QL: NEGATIVE

## 2017-04-30 PROCEDURE — 85018 HEMOGLOBIN: CPT | Performed by: OBSTETRICS & GYNECOLOGY

## 2017-04-30 PROCEDURE — 25000132 ZZH RX MED GY IP 250 OP 250 PS 637: Performed by: OBSTETRICS & GYNECOLOGY

## 2017-04-30 PROCEDURE — 12000029 ZZH R&B OB INTERMEDIATE

## 2017-04-30 PROCEDURE — 36415 COLL VENOUS BLD VENIPUNCTURE: CPT | Performed by: OBSTETRICS & GYNECOLOGY

## 2017-04-30 RX ADMIN — SENNOSIDES AND DOCUSATE SODIUM 2 TABLET: 8.6; 5 TABLET ORAL at 09:42

## 2017-04-30 RX ADMIN — IBUPROFEN 800 MG: 400 TABLET ORAL at 15:57

## 2017-04-30 RX ADMIN — ACETAMINOPHEN 650 MG: 325 TABLET, FILM COATED ORAL at 01:02

## 2017-04-30 RX ADMIN — IBUPROFEN 800 MG: 400 TABLET ORAL at 09:42

## 2017-04-30 RX ADMIN — SENNOSIDES AND DOCUSATE SODIUM 1 TABLET: 8.6; 5 TABLET ORAL at 21:28

## 2017-04-30 NOTE — PLAN OF CARE
Problem: Goal Outcome Summary  Goal: Goal Outcome Summary  Outcome: No Change  Pt VSS Breast and formula feeding . Bonding well. Independent with self and  cares. Pain treated with PRN Ibuprofen. Pt unable to void. Bladder scanned- 566ml. Recieved order for Gallego cath placement until 0600. DC at that time and attempt to void post DC. Will cont to monitor.

## 2017-04-30 NOTE — PLAN OF CARE
Problem: Goal Outcome Summary  Goal: Goal Outcome Summary  Outcome: Improving  Data: HR 93 and 111 - will monitor.  Postpartum checks within normal limits - see flow record. Patient eating and drinking normally. Patient  is up ambulating. Urinary retention - pt voided 100 and 50 - bladder scanned for greater than 999 and straight cathed for 1000 - 341 still in bladder. Pt getting up to void soon. Night nurse aware. No apparent signs of infection. second degree repair healing well. Patient performing self cares and is able to care for infant. Mother Hep +. Breastfeeding and formula feeding - education done on risks of formula feeding - pt expressed understanding.   Action: Patient medicated during the shift for pain. Patient education done about postpartum care and breastfeeding.   Response: Positive attachment behaviors observed with infant. Support persons father of baby present.   Plan: Anticipate discharge on 5/1/17.

## 2017-04-30 NOTE — PROGRESS NOTES
[facility] Postpartum Note    S:  Patient has had some urinary retention requiring straight cath's.  Has urge to void but just unable to initiate stream.  Prior to delivery was able to void normally.  Minimal lochia.  No dizziness with standing.    O:  /71  Temp 98.7  F (37.1  C) (Oral)  Pulse 103  Resp 18         Urine output adequate        Abdomen - Fundus firm, at umbilicus, nontender        Extremities - No calf tenderness    A:   Postpartum Day# 1, s/p Vaginal delivery - doing well        Mild tachycardia - no sx's        Urinary retention - likely due to transient periurethral edema    P:  1)  Check Hgb.  If < 9, start po Fe        2)  Straight cath as needed today.  If still unable to void tomorrow, may need an indwelling Gallego and leg bag to go home tomorrow with removal in clinic in a few days.        3)  Probable D/C tomorrow    Hill Navarrete MD

## 2017-04-30 NOTE — PLAN OF CARE
Problem: Goal Outcome Summary  Goal: Goal Outcome Summary  Outcome: Improving  VSS except HR of 120, rechecked and at 103. Up ad natali. Pt was unable to void after multiple attempts. Bladder scan amount 971, straight cath for 1200. Will continue to monitor. Pain managed with oral pain medications. FOB at bedside and supportive.

## 2017-05-01 VITALS
DIASTOLIC BLOOD PRESSURE: 65 MMHG | RESPIRATION RATE: 16 BRPM | SYSTOLIC BLOOD PRESSURE: 124 MMHG | TEMPERATURE: 98.9 F | HEART RATE: 108 BPM

## 2017-05-01 PROCEDURE — 25000132 ZZH RX MED GY IP 250 OP 250 PS 637: Performed by: OBSTETRICS & GYNECOLOGY

## 2017-05-01 PROCEDURE — 40000084 ZZH STATISTIC IP LACTATION SERVICES 16-30 MIN

## 2017-05-01 RX ORDER — IBUPROFEN 400 MG/1
400-800 TABLET, FILM COATED ORAL EVERY 6 HOURS PRN
Qty: 60 TABLET | Refills: 0 | Status: SHIPPED | OUTPATIENT
Start: 2017-05-01

## 2017-05-01 RX ORDER — BREAST PUMP
1 EACH MISCELLANEOUS PRN
Qty: 1 EACH | Refills: 0 | Status: SHIPPED | OUTPATIENT
Start: 2017-05-01

## 2017-05-01 RX ORDER — AMOXICILLIN 250 MG
1-2 CAPSULE ORAL 2 TIMES DAILY
Qty: 60 TABLET | Refills: 0 | Status: SHIPPED | OUTPATIENT
Start: 2017-05-01

## 2017-05-01 RX ADMIN — IBUPROFEN 800 MG: 400 TABLET ORAL at 14:00

## 2017-05-01 RX ADMIN — IBUPROFEN 800 MG: 400 TABLET ORAL at 06:01

## 2017-05-01 NOTE — PLAN OF CARE
Problem: Goal Outcome Summary  Goal: Goal Outcome Summary  Outcome: Improving  VSS. Up ad natali. Pain controlled using ibuprofen. Independent with cares for self and infant. Gallego emptying clear, yellow urine. Gallego discontinued at 0600 per provider's order. Breastfeeding and formula feeding infant. Pt would like a breastfeeding pump prescription. FOB at bedside and supportive.

## 2017-05-01 NOTE — PLAN OF CARE
Problem: Goal Outcome Summary  Goal: Goal Outcome Summary  Outcome: Adequate for Discharge Date Met:  05/01/17  Gallego placed at 1630, with 800cc urine. Cath care done and shown to both patient and partner. Leg bag given with instructions. Discharge meds and pump given. Knows that she has an appointment on may 3@ 1120. Stable ready for home @1800

## 2017-05-01 NOTE — PROVIDER NOTIFICATION
05/01/17 1713   Provider Notification   Provider Name/Title Dr. Kulkarni   Method of Notification Phone   Request Evaluate-Remote   Notification Reason Status Update   notified of urine output. May be discharged home. Back to clinic may 3@1120 for mast removal.

## 2017-05-01 NOTE — DISCHARGE SUMMARY
The patient had a Vaginal delivery on 4/29/2017. Please refer to her delivery summary regarding her delivery. The patient s postpartum course was complicated by urinary retention on PPD #1 for which a Gallego catheter was placed.  It was removed on PPD#2 and a voiding trial was initiated.  She was able to void up to 200 ml at a time with schedule voids but would continue to feel bladder pressure and discomfort and when bladder scanned, would have anywhere from 600-800 ml of retained urine in the bladder.  After bladder training all day with continued increased PVR and discomfort, a Gallego catheter was again replaced and she was discharged home with a leg bag.  She was afebrile throughout her postpartum course and otherwise did well post-partum.  Her most recent hemoglobin measured   Hemoglobin   Date Value Ref Range Status   04/30/2017 10.6 (L) 11.7 - 15.7 g/dL Final     The patient will return to clinic in 6 weeks for routine follow-up exam.   She will return to the office in 48 hours or on 5/3/2017 at 11:20 am with Dr. Miranda for f/u visit.  Discharge instructions were reviewed with the patient.  The patient was given discharge prescriptions for Motrin, Senokot-S and Electric breast pump.  Dr. Olivia Kulkarni

## 2017-05-01 NOTE — PLAN OF CARE
Problem: Goal Outcome Summary  Goal: Goal Outcome Summary  Outcome: Improving  Data:  - tachy - will recheck - pt asymptomatic. Hemoglobin 10.6. Postpartum checks within normal limits - see flow record. Patient eating and drinking normally. Gallego placed due to urinary retention- to be removed  0600. No apparent signs of infection. second degree healing well. Patient performing self cares and is able to care for infant. Breastfeeding and supplementing with formula. Cambodian speaking -  scheduled for am for discharge planning.  Action: Patient medicated during the shift for cramping. See MAR. Patient reassessed within 1 hour after each medication and pain was improved - patient stated she was comfortable. Patient education done about postpartum care. See flow record.  Response: Positive attachment behaviors observed with infant. Support persons father of baby present.   Plan: Anticipate discharge on 5/1/17.

## 2017-05-01 NOTE — PROVIDER NOTIFICATION
05/01/17 1630   Provider Notification   Provider Name/Title Dr. Kulkarni   Method of Notification Phone   Request Evaluate-Remote   Notification Reason Status Update   notified of bladder scan of 885. Order to place mast cath

## 2017-05-01 NOTE — DISCHARGE INSTRUCTIONS
Gallego Catheter Care    A Gallego catheter is a rubber tube that is placed through the urethra (opening where urine comes out) and into the bladder. This helps drain urine from the bladder. There is a small balloon on the end of the tube that is inflated after insertion. This keeps the catheter from sliding out of the bladder.  A Gallego catheter is used to treat urinary retention (unable to pass urine). It is also used when there is incontinence (loss of bladder control).  Home care  Finish taking any prescribed antibiotic even if you are feeling better before then.  It is important to keep bacteria from getting into the collection bag. Do not disconnect the catheter from the collection bag.  Use a leg band to secure the drainage tube, so it does not pull on the catheter. Drain the collection bag when it becomes full using the drain spout at the bottom of the bag.  Do not try to pull or remove your catheter. This will injure your urethra. It must be removed by a doctor or nurse.  Follow-up care  Follow up with your doctor as advised for repeat urine testing and catheter removal or replacement.  When to seek medical care  Get prompt medical attention if any of the following occur:  Fever of 100.4 F (38 C) or higher, or as directed by your health care provider  Bladder pain or fullness  Abdominal swelling, nausea or vomiting or back pain  Blood or urine leakage around the catheter  Bloody urine coming from the catheter (if a new symptom)  Catheter falls out  Catheter stops draining for 6 hours  Weakness, dizziness or fainting    3856-9332 [employer]. [address] [place] [address], [place] [address]. All rights reserved. This information is not intended as a substitute for professional medical care. Always follow your healthcare professional's instructions.      Postpartum Vaginal Delivery Instructions    Activity       Ask family and friends for help when you need it.    Do not place anything in your vagina for  6 weeks.    You are not restricted on other activities, but take it easy for a few weeks to allow your body to recover from delivery.  You are able to do any activities you feel up to that point.    No driving until you have stopped taking your pain medications (usually two weeks after delivery).     Call your health care provider if you have any of these symptoms:       Increased pain, swelling, redness, or fluid around your stiches from an episiotomy or perineal tear.    A fever above 100.4 F (38 C) with or without chills when placing a thermometer under your tongue.    You soak a sanitary pad with blood within 1 hour, or you see blood clots larger than a golf ball.    Bleeding that lasts more than 6 weeks.    Vaginal discharge that smells bad.    Severe pain, cramping or tenderness in your lower belly area.    A need to urinate more frequently (use the toilet more often), more urgently (use the toilet very quickly), or it burns when you urinate.    Nausea and vomiting.    Redness, swelling or pain around a vein in your leg.    Problems breastfeeding or a red or painful area on your breast.    Chest pain and cough or are gasping for air.    Problems coping with sadness, anxiety, or depression.  If you have any concerns about hurting yourself or the baby, call your provider immediately.     You have questions or concerns after you return home.        TO CLINIC ON MAY 3@ 1120 WITH DR. COLVIN FOR MISTRY REMOVAL.  Keep your hands clean:  Always wash your hands before touching your perineal area and stitches.  This helps reduce your risk of infection.  If your hands aren't dirty, you may use an alcohol hand-rub to clean your hands. Keep your nails clean and short.

## 2017-05-01 NOTE — PROVIDER NOTIFICATION
05/01/17 1047   Provider Notification   Provider Name/Title Dr. Kulkarni   Method of Notification Phone   Request Evaluate-Remote   Notification Reason Other     Updated Dr. Kulkarni regarding pt's continued urinary retention. Pt voided small amounts at 0800, 1000 (see I&O flowsheet). States she still feels she has a full bladder, yet not uncomfortable. Tried to empty bladdder again at 1030 without success. Bladder scanned pt for 580 mL. Per MD, continue to monitor pt and bladder train and have continue to void every 2-3 hours. If pt is uncomfortable, straight cath or mast per pt choice. Continue to monitor.

## 2017-05-01 NOTE — PLAN OF CARE
Problem: Urine Elimination, Impaired (Adult)  Goal: Identify Related Risk Factors and Signs and Symptoms  Related risk factors and signs and symptoms are identified upon initiation of Human Response Clinical Practice Guideline (CPG)   Outcome: Adequate for Discharge Date Met:  05/01/17  Cath placed with 800 cc urine return. Cath care shown

## 2017-05-01 NOTE — PLAN OF CARE
"Problem: Goal Outcome Summary  Goal: Goal Outcome Summary  Outcome: No Change  VSS. Postpartum checks WDL - see flowsheet. Gallego removed this AM - pt continues to have urinary retention issues, bladder scanned for 580 mL this AM (see MD telephone note). Pt continuing to bladder train this afternoon and is up voiding q 2-3 hours. Pt denies feeling uncomfortable from a full bladder. Instructed pt to call if she begins to feel uncomfortable or is unable to void. Breastfeeding infant with shield as well as bottle feeding formula - pt states she feels infant \"isn't getting enough\" when breastfeeding. Re-inforced breastfeeding education, and reviewed hand expression with pt who states she is comfortable doing so. Pt states she would like to continue to breast and bottlefeed at home. Pain adequately managed per patient this shift with ibuprofen. Plan to DC patient this late afternoon/early evening after update to MD.       "

## 2017-05-01 NOTE — LACTATION NOTE
Lactation visit. Mom is using a shield for what she says are flat nipples. On observation, her nipples everardo well and reassured her she has very good nipples. Baby should be able to wean easily from the shield. Plan phone f/up within a week of d/c. She is giving a lot of bottles because she cannot squeeze any milk out. Educated mom in how to tell when baby was swallowing and they both identified baby has been swallowing. Enc stim to breasts q 3 hours whether breast or pumping. Encouraged mom to call us PRN.

## 2017-05-01 NOTE — PROGRESS NOTES
[facility] Postpartum Note    S:  Patient without complaints.  Minimal lochia.  Gallego recently removed and she has not yet voided.    O:  Blood pressure 112/71, pulse 108, temperature 98.7  F (37.1  C), temperature source Oral, resp. rate 16, unknown if currently breastfeeding.        Urine output adequate        Abdomen - Fundus firm, at umbilicus, nontender        Extremities - No calf tenderness    A:   Postpartum Day# 2, s/p Vaginal delivery, urinary retention - doing well    P:  1)  Gallego placed yesterday secondary to urinary retention, removed this am.  Discussed that pt should attempt to void every 2 hours.  If unsuccessful, will plan on d/c home with Gallego/ leg bag and f/u in the office in 48 hours.        2)  Breast feeding        3)  Anticipate discharge later today    Olivia Kulkarni, DO

## 2017-05-03 ENCOUNTER — HOSPITAL ENCOUNTER (EMERGENCY)
Facility: CLINIC | Age: 37
Discharge: HOME | End: 2017-05-04
Attending: EMERGENCY MEDICINE | Admitting: EMERGENCY MEDICINE
Payer: COMMERCIAL

## 2017-05-03 DIAGNOSIS — R33.9 URINARY RETENTION: ICD-10-CM

## 2017-05-03 PROCEDURE — 99283 EMERGENCY DEPT VISIT LOW MDM: CPT

## 2017-05-03 PROCEDURE — 51702 INSERT TEMP BLADDER CATH: CPT

## 2017-05-03 NOTE — ED AVS SNAPSHOT
[facility] Emergency Department    [address]    [facility] [address]    Phone:  [phone]    Fax:  [phone]                                       Magaly Jensen   MRN: [id number]    Department:  [facility] Emergency Department   Date of Visit:  5/3/2017           After Visit Summary Signature Page     I have received my discharge instructions, and my questions have been answered. I have discussed any challenges I see with this plan with the nurse or doctor.    ..........................................................................................................................................  Patient/Patient Representative Signature      ..........................................................................................................................................  Patient Representative Print Name and Relationship to Patient    ..................................................               ................................................  Date                                            Time    ..........................................................................................................................................  Reviewed by Signature/Title    ...................................................              ..............................................  Date                                                            Time

## 2017-05-03 NOTE — ED AVS SNAPSHOT
[facility] Emergency Department    [address] [place] [address]    Phone:  [phone]    Fax:  [phone]                                       Magaly Jensen   MRN: [id number]    Department:  [facility] Emergency Department   Date of Visit:  5/3/2017           Patient Information     Date Of Birth          1980        Your diagnoses for this visit were:     Urinary retention        You were seen by Pedro Barclay MD.      Follow-up Information     Please follow up.    Why:  Please recheck with your Obstetrician tomorrow for recheck        Discharge Instructions         Urinary Retention (Female)    Urinary retention is the medical term for difficulty or inability to pass urine, even though your bladder is full.  Causes  For girls and women, the most common cause of urinary retention is a bladder infection. Certain medicines and changes in the body, such as uterine prolapse, can also cause this problem.  Symptoms  Some people have no symptoms. For others, common symptoms include:    Bladder or lower-abdominal pain or fullness    Abdominal swelling    Nausea or vomiting    Back pain    Frequent urination    Feeling that the bladder is still full after urinating    Incontinence (not being able to control the release of urine)  Treatment  This condition is treated by inserting a tube (catheter) into the bladder to drain the urine. This provides immediate relief. The catheter may need to stay in place for a few days. The catheter has a balloon on the tip, which is inflated after insertion. This prevents the catheter from falling out.  Home care    If you were given antibiotics to treat a bladder infection, take them until they are used up, or your healthcare provider tells you to stop. It is important to finish the antibiotics even though you feel better. This is to make sure your infection has cleared.    If a catheter was left in place, it is important  to keep bacteria from getting into the collection bag. Do not disconnect the catheter from the collection bag.    Use a leg band to secure the drainage tube, so it does not pull on the catheter. Drain the collection bag when it becomes full using the drain spout at the bottom of the bag.    Do not pull on or try to remove your catheter. This will injure your urethra. The catheter must be removed by a healthcare provider.  Follow-up care  Follow up with your healthcare provider, or as advised.  If a catheter was left in place, it can usually be removed within 3 to 7 days. Some conditions require that the catheter stays in longer. Your healthcare provider will tell you when to return to have the catheter removed.  When to seek medical advice  Call your healthcare provider right away if any of these occur:    Fever of 100.4 F (38 C) or higher, or as directed by your healthcare provider    Bladder or lower-abdominal pain or fullness    Abdominal swelling, nausea, vomiting, or back pain    Blood or urine leakage around the catheter    Bloody urine coming from the catheter (if a new symptom)    Weakness, dizziness, or fainting    Confusion or change in usual level of alertness    If a catheter was left in place, return if:    Catheter falls out    Catheter stops draining for 6 hours    1125-9973 [employer]. [address] [place] [address]. All rights reserved. This information is not intended as a substitute for professional medical care. Always follow your healthcare professional's instructions.          Gallego Catheter Care    A Gallego catheter is a rubber tube that is placed through the urethra (opening where urine comes out) and into the bladder. This helps drain urine from the bladder. There is a small balloon on the end of the tube that is inflated after insertion. This keeps the catheter from sliding out of the bladder.  A Gallego catheter is used to treat urinary retention (unable to pass  urine). It is also used when there is incontinence (loss of bladder control).  Home care    Finish taking any prescribed antibiotic even if you are feeling better before then.    It is important to keep bacteria from getting into the collection bag. Do not disconnect the catheter from the collection bag.    Use a leg band to secure the drainage tube, so it does not pull on the catheter. Drain the collection bag when it becomes full using the drain spout at the bottom of the bag.    Do not try to pull or remove your catheter. This will injure your urethra. It must be removed by a doctor or nurse.  Follow-up care  Follow up with your doctor as advised for repeat urine testing and catheter removal or replacement.  When to seek medical care  Get prompt medical attention if any of the following occur:    Fever of 100.4 F (38 C) or higher, or as directed by your health care provider    Bladder pain or fullness    Abdominal swelling, nausea or vomiting or back pain    Blood or urine leakage around the catheter    Bloody urine coming from the catheter (if a new symptom)    Catheter falls out    Catheter stops draining for 6 hours    Weakness, dizziness or fainting    7159-7079 [employer]. [address] [place] [address]. All rights reserved. This information is not intended as a substitute for professional medical care. Always follow your healthcare professional's instructions.          24 Hour Appointment Hotline       To make an appointment at any [facility], call [phone] ([phone]). If you don't have a family doctor or clinic, we will help you find one. [facility] clinics are conveniently located to serve the needs of you and your family.             Review of your medicines      Our records show that you are taking the medicines listed below. If these are incorrect, please call your family doctor or clinic.        Dose / Directions Last dose taken    breast pump Misc   Dose:  1  each   Quantity:  1 each        1 each as needed   Refills:  0        ibuprofen 400 MG tablet   Commonly known as:  ADVIL/MOTRIN   Dose:  400-800 mg   Quantity:  60 tablet        Take 1-2 tablets (400-800 mg) by mouth every 6 hours as needed for other (cramping)   Refills:  0        prenatal multivitamin  plus iron 27-0.8 MG Tabs per tablet   Dose:  1 tablet        Take 1 tablet by mouth daily   Refills:  0        senna-docusate 8.6-50 MG per tablet   Commonly known as:  SENOKOT-S;PERICOLACE   Dose:  1-2 tablet   Quantity:  60 tablet        Take 1-2 tablets by mouth 2 times daily   Refills:  0                Procedures and tests performed during your visit     UA with Microscopic      Orders Needing Specimen Collection     None      Pending Results     No orders found for last 3 day(s).            Pending Culture Results     No orders found for last 3 day(s).            Pending Results Instructions     If you had any lab results that were not finalized at the time of your Discharge, you can call the ED Lab Result RN at [phone]. You will be contacted by this team for any positive Lab results or changes in treatment. The nurses are available 7 days a week from 10A to 6:30P.  You can leave a message 24 hours per day and they will return your call.        Test Results From Your Hospital Stay        5/4/2017 12:39 AM      Component Results     Component Value Ref Range & Units Status    Color Urine Straw  Final    Appearance Urine Clear  Final    Glucose Urine Negative NEG mg/dL Final    Bilirubin Urine Negative NEG Final    Ketones Urine Negative NEG mg/dL Final    Specific Gravity Urine 1.004 1.003 - 1.035 Final    Blood Urine Small (A) NEG Final    pH Urine 7.0 5.0 - 7.0 pH Final    Protein Albumin Urine Negative NEG mg/dL Final    Urobilinogen mg/dL 0.0 0.0 - 2.0 mg/dL Final    Nitrite Urine Negative NEG Final    Leukocyte Esterase Urine Negative NEG Final    Source Catheterized Urine  Final    WBC Urine 0 0 - 2  /HPF Final    RBC Urine <1 0 - 2 /HPF Final                Clinical Quality Measure: Blood Pressure Screening     Your blood pressure was checked while you were in the emergency department today. The last reading we obtained was  BP: 114/82 . Please read the guidelines below about what these numbers mean and what you should do about them.  If your systolic blood pressure (the top number) is less than 120 and your diastolic blood pressure (the bottom number) is less than 80, then your blood pressure is normal. There is nothing more that you need to do about it.  If your systolic blood pressure (the top number) is 120-139 or your diastolic blood pressure (the bottom number) is 80-89, your blood pressure may be higher than it should be. You should have your blood pressure rechecked within a year by a primary care provider.  If your systolic blood pressure (the top number) is 140 or greater or your diastolic blood pressure (the bottom number) is 90 or greater, you may have high blood pressure. High blood pressure is treatable, but if left untreated over time it can put you at risk for heart attack, stroke, or kidney failure. You should have your blood pressure rechecked by a primary care provider within the next 4 weeks.  If your provider in the emergency department today gave you specific instructions to follow-up with your doctor or provider even sooner than that, you should follow that instruction and not wait for up to 4 weeks for your follow-up visit.        Thank you for choosing [facility]       Thank you for choosing [facility] for your care. Our goal is always to provide you with excellent care. Hearing back from our patients is one way we can continue to improve our services. Please take a few minutes to complete the written survey that you may receive in the mail after you visit with us. Thank you!        [employer] Information     [employer] lets you send messages to your doctor, view your test results, renew your  "prescriptions, schedule appointments and more. To sign up, go to [email or web address] . Click on \"Log in\" on the left side of the screen, which will take you to the Welcome page. Then click on \"Sign up Now\" on the right side of the page.     You will be asked to enter the access code listed below, as well as some personal information. Please follow the directions to create your username and password.     Your access code is: [email or web address]  Expires: 2017  8:56 AM     Your access code will  in 90 days. If you need help or a new code, please call your [facility] clinic or [phone].        Care EveryWhere ID     This is your Care EveryWhere ID. This could be used by other organizations to access your [facility] medical records  [id number]        After Visit Summary       This is your record. Keep this with you and show to your community pharmacist(s) and doctor(s) at your next visit.                  "

## 2017-05-04 VITALS
DIASTOLIC BLOOD PRESSURE: 82 MMHG | RESPIRATION RATE: 18 BRPM | TEMPERATURE: 97.1 F | SYSTOLIC BLOOD PRESSURE: 114 MMHG | OXYGEN SATURATION: 99 % | HEART RATE: 89 BPM

## 2017-05-04 LAB
ALBUMIN UR-MCNC: NEGATIVE MG/DL
APPEARANCE UR: CLEAR
BILIRUB UR QL STRIP: NEGATIVE
COLOR UR AUTO: ABNORMAL
GLUCOSE UR STRIP-MCNC: NEGATIVE MG/DL
HGB UR QL STRIP: ABNORMAL
KETONES UR STRIP-MCNC: NEGATIVE MG/DL
LEUKOCYTE ESTERASE UR QL STRIP: NEGATIVE
NITRATE UR QL: NEGATIVE
PH UR STRIP: 7 PH (ref 5–7)
RBC #/AREA URNS AUTO: <1 /HPF (ref 0–2)
SP GR UR STRIP: 1 (ref 1–1.03)
URN SPEC COLLECT METH UR: ABNORMAL
UROBILINOGEN UR STRIP-MCNC: 0 MG/DL (ref 0–2)
WBC #/AREA URNS AUTO: 0 /HPF (ref 0–2)

## 2017-05-04 PROCEDURE — 81001 URINALYSIS AUTO W/SCOPE: CPT | Performed by: EMERGENCY MEDICINE

## 2017-05-04 ASSESSMENT — ENCOUNTER SYMPTOMS
ABDOMINAL DISTENTION: 1
DIFFICULTY URINATING: 1
ABDOMINAL PAIN: 1

## 2017-05-04 NOTE — ED PROVIDER NOTES
History     Chief Complaint:    Urinary Retention      HPI   Magaly Jensen is a pleasant 36 year old female with a history of hepatitis B without clinic symptoms, who is a G1 now P1 who delivered a normal spontaneous vaginal delivery at 39 weeks gestation on April 29th 4 days ago. It sounds like she had some urinary retention in labor that required straight cath. She then actually required a mast to be placed on post delivery day one. This was removed one day later but then it needed to be reinserted on post delivery day 2 . She was discharged to home 2 days ago with a mast catheter in place. She follows with the [facility] for her obstetric care. The patient states that she had the Mast catheter removed today at the [facility]. She was able to produce a small amount of urine at 1900 today, but states that she has not since then, and feels quite full in her low abdomen.      Allergies:  No known drug allergies.      Medications:    Ibuprofen   Senna-docusate   Prenatal vitamin     Past Medical History:    Hepatitis B   Horseshoe kidney     Past Surgical History:    Rhinoplasty     Family History:    History reviewed. No pertinent family history.     Social History:  Marital Status: Single  Presents to the ED with a friend  Tobacco Use: Never smoker  Alcohol Use: No  PCP: Yadi Velasco        Review of Systems   Gastrointestinal: Positive for abdominal distention and abdominal pain.   Genitourinary: Positive for difficulty urinating.         Physical Exam   First Vitals:  BP: (!) 147/101  Pulse: 105  Heart Rate: 105  Temp: 97.1  F (36.2  C)  Resp: 18  SpO2: 99 %    Physical Exam  Constitutional:  Appears well-developed and well-nourished. Alert. Conversant. Non toxic.  HENT:   Head: Atraumatic.   Nose: Nose normal.  Mouth/Throat: Oral mucosa is clear and moist. no trismus. Pharynx normal. Tonsils symmetric. No tonsillar enlargement, erythema, or  exudate.  Eyes: Conjunctivae normal. EOM normal. Pupils equal, round, and reactive to light. No scleral icterus.   Neck: Normal range of motion. Neck supple. No tracheal deviation present.   Cardiovascular: Normal rate, regular rhythm. No gallop. No friction rub. No murmur heard. Symmetric radial artery pulses   Pulmonary/Chest: Effort normal. No stridor. No respiratory distress. No wheezes. No rales. No rhonchi . No tenderness.   Abdominal: Soft. Bowel sounds normal. No distension. No mass. No tenderness. No rebound. No guarding. No CVA tenderness.   : mast cath already placed by nursing. Draining clear yellow urine.  Musculoskeletal: Normal range of motion. No edema. No tenderness. No deformity   Lymph: No cervical adenopathy.   Neurological: Alert and oriented to person, place, and time. Normal strength. CN II-VII intact. No sensory deficit. GCS eye subscore is 4. GCS verbal subscore is 5. GCS motor subscore is 6. Normal coordination   Skin: Skin is warm and dry. No rash noted. No pallor. Normal capillary refill.  Psychiatric:  Normal mood. Normal affect.     Emergency Department Course     Laboratory:  UA: Straw yellow urine, blood small otherwise WNL     Emergency Department Course:  Nursing notes and vitals reviewed.  I performed an exam of the patient as documented above.  We placed a mast cathter with significant urine output  Urine sample was obtained and sent for laboratory analysis, findings above.   Findings and plan explained to the patient. Patient discharged home with instructions regarding supportive care, medications, and reasons to return. The importance of close follow-up was reviewed.       Impression & Plan      Medical Decision Making:  Patient is a pleasant 36 year old female who is 4 days status post  of a term infant. Her post partum course was complicated by urinary retention requiring Mast placement by obstetric team. She was seen in clinic today for follow up and they removed the  mast but since then, she has been unable to void her bladder. She did have acute urinary retention and had a large amount of clear yellow urine output after we placed the mast here. She now has tremendous improvement in her super pubic pain. She has no flank pain or other symptoms to suggest reflux into her kidneys. Urinalysis is negative for infection. She has no history or urinary retention or other urethral stricture in the past. We will have her keep her mast catheter in place and follow up with her gynecologist for evaluation tomorrow. Given limited data and my references about Flomax in breast feeding, we will hold off on that for now, but I encouraged the patient to discuss that with her gynecologist.       Diagnosis:    ICD-10-CM    1. Urinary retention R33.9      Disposition:  Discharged to home    Tank HORVATH, am serving as a scribe on 5/4/2017 at 12:25 AM to personally document services performed by Dr. Barclay based on my observations and the provider's statements to me.   5/3/2017   [facility] EMERGENCY DEPARTMENT       Pedro Barclay MD  05/08/17 [id number]

## 2017-05-04 NOTE — DISCHARGE INSTRUCTIONS
Urinary Retention (Female)    Urinary retention is the medical term for difficulty or inability to pass urine, even though your bladder is full.  Causes  For girls and women, the most common cause of urinary retention is a bladder infection. Certain medicines and changes in the body, such as uterine prolapse, can also cause this problem.  Symptoms  Some people have no symptoms. For others, common symptoms include:    Bladder or lower-abdominal pain or fullness    Abdominal swelling    Nausea or vomiting    Back pain    Frequent urination    Feeling that the bladder is still full after urinating    Incontinence (not being able to control the release of urine)  Treatment  This condition is treated by inserting a tube (catheter) into the bladder to drain the urine. This provides immediate relief. The catheter may need to stay in place for a few days. The catheter has a balloon on the tip, which is inflated after insertion. This prevents the catheter from falling out.  Home care    If you were given antibiotics to treat a bladder infection, take them until they are used up, or your healthcare provider tells you to stop. It is important to finish the antibiotics even though you feel better. This is to make sure your infection has cleared.    If a catheter was left in place, it is important to keep bacteria from getting into the collection bag. Do not disconnect the catheter from the collection bag.    Use a leg band to secure the drainage tube, so it does not pull on the catheter. Drain the collection bag when it becomes full using the drain spout at the bottom of the bag.    Do not pull on or try to remove your catheter. This will injure your urethra. The catheter must be removed by a healthcare provider.  Follow-up care  Follow up with your healthcare provider, or as advised.  If a catheter was left in place, it can usually be removed within 3 to 7 days. Some conditions require that the catheter stays in longer. Your  healthcare provider will tell you when to return to have the catheter removed.  When to seek medical advice  Call your healthcare provider right away if any of these occur:    Fever of 100.4 F (38 C) or higher, or as directed by your healthcare provider    Bladder or lower-abdominal pain or fullness    Abdominal swelling, nausea, vomiting, or back pain    Blood or urine leakage around the catheter    Bloody urine coming from the catheter (if a new symptom)    Weakness, dizziness, or fainting    Confusion or change in usual level of alertness    If a catheter was left in place, return if:    Catheter falls out    Catheter stops draining for 6 hours    8140-5549 [employer]. [address] [place] [address]. All rights reserved. This information is not intended as a substitute for professional medical care. Always follow your healthcare professional's instructions.          Gallego Catheter Care    A Gallego catheter is a rubber tube that is placed through the urethra (opening where urine comes out) and into the bladder. This helps drain urine from the bladder. There is a small balloon on the end of the tube that is inflated after insertion. This keeps the catheter from sliding out of the bladder.  A Gallego catheter is used to treat urinary retention (unable to pass urine). It is also used when there is incontinence (loss of bladder control).  Home care    Finish taking any prescribed antibiotic even if you are feeling better before then.    It is important to keep bacteria from getting into the collection bag. Do not disconnect the catheter from the collection bag.    Use a leg band to secure the drainage tube, so it does not pull on the catheter. Drain the collection bag when it becomes full using the drain spout at the bottom of the bag.    Do not try to pull or remove your catheter. This will injure your urethra. It must be removed by a doctor or nurse.  Follow-up care  Follow up with your doctor  as advised for repeat urine testing and catheter removal or replacement.  When to seek medical care  Get prompt medical attention if any of the following occur:    Fever of 100.4 F (38 C) or higher, or as directed by your health care provider    Bladder pain or fullness    Abdominal swelling, nausea or vomiting or back pain    Blood or urine leakage around the catheter    Bloody urine coming from the catheter (if a new symptom)    Catheter falls out    Catheter stops draining for 6 hours    Weakness, dizziness or fainting    2612-3436 [employer]. [address] [place] [address]. All rights reserved. This information is not intended as a substitute for professional medical care. Always follow your healthcare professional's instructions.

## 2017-05-05 ENCOUNTER — TELEPHONE (OUTPATIENT)
Dept: OBGYN | Facility: CLINIC | Age: 37
End: 2017-05-05

## 2018-07-12 DIAGNOSIS — Z34.90 SUPERVISION OF NORMAL PREGNANCY: Primary | ICD-10-CM

## 2019-02-27 ENCOUNTER — HOSPITAL ENCOUNTER (INPATIENT)
Facility: CLINIC | Age: 39
LOS: 3 days | Discharge: HOME | End: 2019-03-02
Attending: OBSTETRICS & GYNECOLOGY | Admitting: OBSTETRICS & GYNECOLOGY
Payer: MEDICAID

## 2019-02-27 PROBLEM — O42.90 ROM (RUPTURE OF MEMBRANES), PREMATURE: Status: ACTIVE | Noted: 2019-02-27

## 2019-02-27 LAB
ABO + RH BLD: NORMAL
ABO + RH BLD: NORMAL
ALBUMIN SERPL-MCNC: 2.7 G/DL (ref 3.4–5)
ALP SERPL-CCNC: 116 U/L (ref 40–150)
ALT SERPL W P-5'-P-CCNC: 49 U/L (ref 0–50)
AST SERPL W P-5'-P-CCNC: 37 U/L (ref 0–45)
BILIRUB DIRECT SERPL-MCNC: <0.1 MG/DL (ref 0–0.2)
BILIRUB SERPL-MCNC: 0.2 MG/DL (ref 0.2–1.3)
BLD GP AB SCN SERPL QL: NORMAL
BLOOD BANK CMNT PATIENT-IMP: NORMAL
ERYTHROCYTE [DISTWIDTH] IN BLOOD BY AUTOMATED COUNT: 14.2 % (ref 10–15)
GLUCOSE BLDC GLUCOMTR-MCNC: 71 MG/DL (ref 70–99)
HCT VFR BLD AUTO: 44.4 % (ref 35–47)
HGB BLD-MCNC: 14.5 G/DL (ref 11.7–15.7)
MCH RBC QN AUTO: 30.9 PG (ref 26.5–33)
MCHC RBC AUTO-ENTMCNC: 32.7 G/DL (ref 31.5–36.5)
MCV RBC AUTO: 95 FL (ref 78–100)
PLATELET # BLD AUTO: 213 10E9/L (ref 150–450)
PROT SERPL-MCNC: 7.5 G/DL (ref 6.8–8.8)
RBC # BLD AUTO: 4.7 10E12/L (ref 3.8–5.2)
SPECIMEN EXP DATE BLD: NORMAL
WBC # BLD AUTO: 10.4 10E9/L (ref 4–11)

## 2019-02-27 PROCEDURE — 86901 BLOOD TYPING SEROLOGIC RH(D): CPT | Performed by: OBSTETRICS & GYNECOLOGY

## 2019-02-27 PROCEDURE — G0463 HOSPITAL OUTPT CLINIC VISIT: HCPCS

## 2019-02-27 PROCEDURE — 87517 HEPATITIS B DNA QUANT: CPT | Performed by: OBSTETRICS & GYNECOLOGY

## 2019-02-27 PROCEDURE — 25000128 H RX IP 250 OP 636: Performed by: OBSTETRICS & GYNECOLOGY

## 2019-02-27 PROCEDURE — 86900 BLOOD TYPING SEROLOGIC ABO: CPT | Performed by: OBSTETRICS & GYNECOLOGY

## 2019-02-27 PROCEDURE — 25800030 ZZH RX IP 258 OP 636: Performed by: OBSTETRICS & GYNECOLOGY

## 2019-02-27 PROCEDURE — 0064U ANTB TP TOTAL&RPR IA QUAL: CPT | Performed by: OBSTETRICS & GYNECOLOGY

## 2019-02-27 PROCEDURE — 12000000 ZZH R&B MED SURG/OB

## 2019-02-27 PROCEDURE — 85027 COMPLETE CBC AUTOMATED: CPT | Performed by: OBSTETRICS & GYNECOLOGY

## 2019-02-27 PROCEDURE — 00000146 ZZHCL STATISTIC GLUCOSE BY METER IP

## 2019-02-27 PROCEDURE — 80076 HEPATIC FUNCTION PANEL: CPT | Performed by: OBSTETRICS & GYNECOLOGY

## 2019-02-27 PROCEDURE — 36415 COLL VENOUS BLD VENIPUNCTURE: CPT | Performed by: OBSTETRICS & GYNECOLOGY

## 2019-02-27 PROCEDURE — 86850 RBC ANTIBODY SCREEN: CPT | Performed by: OBSTETRICS & GYNECOLOGY

## 2019-02-27 RX ORDER — OXYTOCIN/0.9 % SODIUM CHLORIDE 30/500 ML
1-24 PLASTIC BAG, INJECTION (ML) INTRAVENOUS CONTINUOUS
Status: DISCONTINUED | OUTPATIENT
Start: 2019-02-27 | End: 2019-02-28

## 2019-02-27 RX ORDER — DEXTROSE MONOHYDRATE 25 G/50ML
25-50 INJECTION, SOLUTION INTRAVENOUS
Status: DISCONTINUED | OUTPATIENT
Start: 2019-02-27 | End: 2019-02-28

## 2019-02-27 RX ORDER — TERBUTALINE SULFATE 1 MG/ML
0.25 INJECTION, SOLUTION SUBCUTANEOUS
Status: DISCONTINUED | OUTPATIENT
Start: 2019-02-27 | End: 2019-02-28

## 2019-02-27 RX ORDER — LIDOCAINE 40 MG/G
CREAM TOPICAL
Status: DISCONTINUED | OUTPATIENT
Start: 2019-02-27 | End: 2019-02-28

## 2019-02-27 RX ORDER — OXYTOCIN/0.9 % SODIUM CHLORIDE 30/500 ML
100-340 PLASTIC BAG, INJECTION (ML) INTRAVENOUS CONTINUOUS PRN
Status: COMPLETED | OUTPATIENT
Start: 2019-02-27 | End: 2019-02-28

## 2019-02-27 RX ORDER — OXYCODONE AND ACETAMINOPHEN 5; 325 MG/1; MG/1
1 TABLET ORAL
Status: DISCONTINUED | OUTPATIENT
Start: 2019-02-27 | End: 2019-02-28

## 2019-02-27 RX ORDER — IBUPROFEN 800 MG/1
800 TABLET, FILM COATED ORAL
Status: COMPLETED | OUTPATIENT
Start: 2019-02-27 | End: 2019-02-28

## 2019-02-27 RX ORDER — PROCHLORPERAZINE 25 MG
25 SUPPOSITORY, RECTAL RECTAL EVERY 12 HOURS PRN
Status: DISCONTINUED | OUTPATIENT
Start: 2019-02-27 | End: 2019-02-28

## 2019-02-27 RX ORDER — SODIUM CHLORIDE, SODIUM LACTATE, POTASSIUM CHLORIDE, CALCIUM CHLORIDE 600; 310; 30; 20 MG/100ML; MG/100ML; MG/100ML; MG/100ML
INJECTION, SOLUTION INTRAVENOUS CONTINUOUS
Status: DISCONTINUED | OUTPATIENT
Start: 2019-02-27 | End: 2019-02-28

## 2019-02-27 RX ORDER — CARBOPROST TROMETHAMINE 250 UG/ML
250 INJECTION, SOLUTION INTRAMUSCULAR
Status: DISCONTINUED | OUTPATIENT
Start: 2019-02-27 | End: 2019-02-28

## 2019-02-27 RX ORDER — NALOXONE HYDROCHLORIDE 0.4 MG/ML
.1-.4 INJECTION, SOLUTION INTRAMUSCULAR; INTRAVENOUS; SUBCUTANEOUS
Status: DISCONTINUED | OUTPATIENT
Start: 2019-02-27 | End: 2019-02-28

## 2019-02-27 RX ORDER — DEXTROSE, SODIUM CHLORIDE, SODIUM LACTATE, POTASSIUM CHLORIDE, AND CALCIUM CHLORIDE 5; .6; .31; .03; .02 G/100ML; G/100ML; G/100ML; G/100ML; G/100ML
INJECTION, SOLUTION INTRAVENOUS CONTINUOUS
Status: DISCONTINUED | OUTPATIENT
Start: 2019-02-27 | End: 2019-02-28

## 2019-02-27 RX ORDER — ACETAMINOPHEN 325 MG/1
650 TABLET ORAL EVERY 4 HOURS PRN
Status: DISCONTINUED | OUTPATIENT
Start: 2019-02-27 | End: 2019-02-28

## 2019-02-27 RX ORDER — OXYTOCIN 10 [USP'U]/ML
10 INJECTION, SOLUTION INTRAMUSCULAR; INTRAVENOUS
Status: DISCONTINUED | OUTPATIENT
Start: 2019-02-27 | End: 2019-02-28

## 2019-02-27 RX ORDER — ONDANSETRON 2 MG/ML
4 INJECTION INTRAMUSCULAR; INTRAVENOUS EVERY 6 HOURS PRN
Status: DISCONTINUED | OUTPATIENT
Start: 2019-02-27 | End: 2019-02-28

## 2019-02-27 RX ORDER — SODIUM CHLORIDE 9 MG/ML
INJECTION, SOLUTION INTRAVENOUS CONTINUOUS
Status: DISCONTINUED | OUTPATIENT
Start: 2019-02-27 | End: 2019-02-28

## 2019-02-27 RX ORDER — FENTANYL CITRATE 50 UG/ML
50-100 INJECTION, SOLUTION INTRAMUSCULAR; INTRAVENOUS
Status: DISCONTINUED | OUTPATIENT
Start: 2019-02-27 | End: 2019-02-28

## 2019-02-27 RX ORDER — METHYLERGONOVINE MALEATE 0.2 MG/ML
200 INJECTION INTRAVENOUS
Status: DISCONTINUED | OUTPATIENT
Start: 2019-02-27 | End: 2019-02-28

## 2019-02-27 RX ORDER — NICOTINE POLACRILEX 4 MG
15-30 LOZENGE BUCCAL
Status: DISCONTINUED | OUTPATIENT
Start: 2019-02-27 | End: 2019-02-28

## 2019-02-27 RX ADMIN — SODIUM CHLORIDE, POTASSIUM CHLORIDE, SODIUM LACTATE AND CALCIUM CHLORIDE: 600; 310; 30; 20 INJECTION, SOLUTION INTRAVENOUS at 22:39

## 2019-02-27 RX ADMIN — SODIUM CHLORIDE, POTASSIUM CHLORIDE, SODIUM LACTATE AND CALCIUM CHLORIDE: 600; 310; 30; 20 INJECTION, SOLUTION INTRAVENOUS at 23:37

## 2019-02-27 RX ADMIN — SODIUM CHLORIDE, SODIUM LACTATE, POTASSIUM CHLORIDE, CALCIUM CHLORIDE AND DEXTROSE MONOHYDRATE: 5; 600; 310; 30; 20 INJECTION, SOLUTION INTRAVENOUS at 23:40

## 2019-02-28 LAB
GLUCOSE BLDC GLUCOMTR-MCNC: 82 MG/DL (ref 70–99)
T PALLIDUM AB SER QL: NONREACTIVE

## 2019-02-28 PROCEDURE — 72200001 ZZH LABOR CARE VAGINAL DELIVERY SINGLE

## 2019-02-28 PROCEDURE — 0KQM0ZZ REPAIR PERINEUM MUSCLE, OPEN APPROACH: ICD-10-PCS | Performed by: OBSTETRICS & GYNECOLOGY

## 2019-02-28 PROCEDURE — 25000128 H RX IP 250 OP 636: Performed by: OBSTETRICS & GYNECOLOGY

## 2019-02-28 PROCEDURE — 25000132 ZZH RX MED GY IP 250 OP 250 PS 637: Performed by: OBSTETRICS & GYNECOLOGY

## 2019-02-28 PROCEDURE — 0UQKXZZ REPAIR HYMEN, EXTERNAL APPROACH: ICD-10-PCS | Performed by: OBSTETRICS & GYNECOLOGY

## 2019-02-28 PROCEDURE — 25000125 ZZHC RX 250: Performed by: OBSTETRICS & GYNECOLOGY

## 2019-02-28 PROCEDURE — 00000146 ZZHCL STATISTIC GLUCOSE BY METER IP

## 2019-02-28 PROCEDURE — 12000000 ZZH R&B MED SURG/OB

## 2019-02-28 RX ORDER — HYDROCORTISONE 2.5 %
CREAM (GRAM) TOPICAL 3 TIMES DAILY PRN
Status: DISCONTINUED | OUTPATIENT
Start: 2019-02-28 | End: 2019-03-02 | Stop reason: HOSPADM

## 2019-02-28 RX ORDER — OXYTOCIN/0.9 % SODIUM CHLORIDE 30/500 ML
1-24 PLASTIC BAG, INJECTION (ML) INTRAVENOUS CONTINUOUS
Status: DISCONTINUED | OUTPATIENT
Start: 2019-02-28 | End: 2019-02-28

## 2019-02-28 RX ORDER — BISACODYL 10 MG
10 SUPPOSITORY, RECTAL RECTAL DAILY PRN
Status: DISCONTINUED | OUTPATIENT
Start: 2019-03-02 | End: 2019-03-02 | Stop reason: HOSPADM

## 2019-02-28 RX ORDER — OXYTOCIN/0.9 % SODIUM CHLORIDE 30/500 ML
340 PLASTIC BAG, INJECTION (ML) INTRAVENOUS CONTINUOUS PRN
Status: DISCONTINUED | OUTPATIENT
Start: 2019-02-28 | End: 2019-03-02 | Stop reason: HOSPADM

## 2019-02-28 RX ORDER — ACETAMINOPHEN 325 MG/1
650 TABLET ORAL EVERY 4 HOURS PRN
Status: DISCONTINUED | OUTPATIENT
Start: 2019-02-28 | End: 2019-03-02 | Stop reason: HOSPADM

## 2019-02-28 RX ORDER — OXYTOCIN/0.9 % SODIUM CHLORIDE 30/500 ML
100 PLASTIC BAG, INJECTION (ML) INTRAVENOUS CONTINUOUS
Status: DISCONTINUED | OUTPATIENT
Start: 2019-02-28 | End: 2019-03-02 | Stop reason: HOSPADM

## 2019-02-28 RX ORDER — AMOXICILLIN 250 MG
2 CAPSULE ORAL 2 TIMES DAILY
Status: DISCONTINUED | OUTPATIENT
Start: 2019-02-28 | End: 2019-03-02 | Stop reason: HOSPADM

## 2019-02-28 RX ORDER — LANOLIN 100 %
OINTMENT (GRAM) TOPICAL
Status: DISCONTINUED | OUTPATIENT
Start: 2019-02-28 | End: 2019-03-02 | Stop reason: HOSPADM

## 2019-02-28 RX ORDER — IBUPROFEN 800 MG/1
800 TABLET, FILM COATED ORAL EVERY 6 HOURS PRN
Status: DISCONTINUED | OUTPATIENT
Start: 2019-02-28 | End: 2019-03-02 | Stop reason: HOSPADM

## 2019-02-28 RX ORDER — AMOXICILLIN 250 MG
1 CAPSULE ORAL 2 TIMES DAILY
Status: DISCONTINUED | OUTPATIENT
Start: 2019-02-28 | End: 2019-03-02 | Stop reason: HOSPADM

## 2019-02-28 RX ORDER — OXYCODONE HYDROCHLORIDE 5 MG/1
5 TABLET ORAL EVERY 4 HOURS PRN
Status: DISCONTINUED | OUTPATIENT
Start: 2019-02-28 | End: 2019-03-02 | Stop reason: HOSPADM

## 2019-02-28 RX ORDER — LIDOCAINE 40 MG/G
CREAM TOPICAL
Status: DISCONTINUED | OUTPATIENT
Start: 2019-02-28 | End: 2019-02-28

## 2019-02-28 RX ORDER — CARBOPROST TROMETHAMINE 250 UG/ML
250 INJECTION, SOLUTION INTRAMUSCULAR
Status: DISCONTINUED | OUTPATIENT
Start: 2019-02-28 | End: 2019-03-02 | Stop reason: HOSPADM

## 2019-02-28 RX ORDER — MISOPROSTOL 200 UG/1
400 TABLET ORAL
Status: DISCONTINUED | OUTPATIENT
Start: 2019-02-28 | End: 2019-03-02 | Stop reason: HOSPADM

## 2019-02-28 RX ORDER — NALOXONE HYDROCHLORIDE 0.4 MG/ML
.1-.4 INJECTION, SOLUTION INTRAMUSCULAR; INTRAVENOUS; SUBCUTANEOUS
Status: DISCONTINUED | OUTPATIENT
Start: 2019-02-28 | End: 2019-03-02 | Stop reason: HOSPADM

## 2019-02-28 RX ORDER — METHYLERGONOVINE MALEATE 0.2 MG/ML
200 INJECTION INTRAVENOUS
Status: DISCONTINUED | OUTPATIENT
Start: 2019-02-28 | End: 2019-03-02 | Stop reason: HOSPADM

## 2019-02-28 RX ORDER — OXYTOCIN 10 [USP'U]/ML
10 INJECTION, SOLUTION INTRAMUSCULAR; INTRAVENOUS
Status: DISCONTINUED | OUTPATIENT
Start: 2019-02-28 | End: 2019-03-02 | Stop reason: HOSPADM

## 2019-02-28 RX ADMIN — IBUPROFEN 800 MG: 800 TABLET ORAL at 08:59

## 2019-02-28 RX ADMIN — IBUPROFEN 800 MG: 800 TABLET, FILM COATED ORAL at 03:00

## 2019-02-28 RX ADMIN — OXYTOCIN-SODIUM CHLORIDE 0.9% IV SOLN 30 UNIT/500ML 340 ML/HR: 30-0.9/5 SOLUTION at 00:34

## 2019-02-28 RX ADMIN — IBUPROFEN 800 MG: 800 TABLET ORAL at 21:58

## 2019-02-28 RX ADMIN — FENTANYL CITRATE 100 MCG: 50 INJECTION, SOLUTION INTRAMUSCULAR; INTRAVENOUS at 01:46

## 2019-02-28 RX ADMIN — IBUPROFEN 800 MG: 800 TABLET ORAL at 15:11

## 2019-02-28 RX ADMIN — LIDOCAINE HYDROCHLORIDE 15 ML: 10 INJECTION, SOLUTION EPIDURAL; INFILTRATION; INTRACAUDAL; PERINEURAL at 00:34

## 2019-02-28 RX ADMIN — SENNOSIDES AND DOCUSATE SODIUM 1 TABLET: 8.6; 5 TABLET ORAL at 08:59

## 2019-02-28 RX ADMIN — SENNOSIDES AND DOCUSATE SODIUM 2 TABLET: 8.6; 5 TABLET ORAL at 22:01

## 2019-02-28 NOTE — L&D DELIVERY NOTE
Magaly Jensen is a 38 year old  who was admitted at 40w0d for SROM, clear fluid around 2130.  Shortly after arrival, she had onset of spontaneous labor.  She progressed normally and was found to be complete at  0020 at which time she started active second stage.  During pushing, it was noted that the perineal body was extremely thin.   She pushed effectively and delivered a viable female infant at 0028 with APGARs of 8 and 9 respectively.  No nuchal cord, however, compound arm was noted.  Spontaneously delivery of an intact placenta with a 3-V cord ensued shortly thereafter.  She received 30 units of IV pitocin as a uterotonic agent. Exam of the perineum revealed a complex second degree perineal laceration with bilateral sulcus extensions and avulsion of the left hymenal ring and labia in a zig-zag pattern.  There was also a hemostatic right periurethral laceration which was not repaired.  Following infiltration of local anesthetic and IV fentanyl, this complex perineal laceration was repaired using three, 3-0 Vicryl sutures which took >60 minutes to repair.  During this time, there was ongoing oozing from the perineal and sulcal lacerations attributing to the blood loss.  The patient tolerated the repair well.  She will be scheduled on a bowel regimen with plans to follow-up in clinic in 2 weeks.  EBL 1000cc, QBL pending.     Rae Gates MD   Pager: [phone]   2019    Pregnancy c/b:   - GDMA1  - AMA; NIPT normal, girl!  - Hep B carrier s/p GI consultation on   - Horseshoe kidney with previous episode of urinary retention  - Traveled to [facility] area ([facility]) in pregnancy; testing negative   - Poor social situation       Delivery Summary    Magaly Jensen MRN# [id number]   Age: 38 year old YOB: 1980        Labor Event Times    Labor onset date:  19 Onset time:  10:40 PM   Dilation complete date:  19 Complete time:  12:20 AM   Start pushing date/time:  2019  "0020      Labor Length    1st Stage (hrs):  1 (min):  40   2nd Stage (hrs):  0 (min):  8   3rd Stage (hrs):  0 (min):  2      Labor Events     labor?:  No   steroids:  None  Labor Type:  Spontaneous  Predominate monitoring during 1st stage:  continuous electronic fetal monitoring     Antibiotics received during labor?:  No     Rupture identifier:  Rupture 1  Rupture date/time: 19   Rupture type:  Spontaneous rupture of membranes prior to induction  Fluid color:  Clear  Fluid odor:  Normal     1:1 continuous labor support provided by?:  RN       Delivery/Placenta Date and Time    Delivery Date:  19 Delivery Time:  12:28 AM   Placenta Date/Time:  2019 12:31 AM  Oxytocin given at the time of delivery:  after delivery of placenta     Vaginal Counts     Initial count performed by 2 team members:   Two Team Members   Abbey Boland RN.   Dr fitzpatrick       Needles Suture Goree Sponges Instruments   Initial counts 2  25    Added to count  6     Final counts 2 6 25    Placed during labor Accounted for at the end of labor   No NA   No NA   No NA    Final count performed by 2 team members:   Two Team Members   Abbey Fitzpatrick          Apgars    Living status:  Living   1 Minute 5 Minute 10 Minute 15 Minute 20 Minute   Skin color: 0  1       Heart rate: 2  2       Reflex irritability: 2  2       Muscle tone: 2  2       Respiratory effort: 2  2       Total: 8  9       Apgars assigned by:  ABBEY BOLAND     Cord    Vessels:  3 Vessels Complications:  None   Cord Blood Disposition:  Lab Gases Sent?:  No      [place] Resuscitation    Methods:  None     [place] Measurements    Weight:  6 lb 8.1 oz Length:  1' 7.75\"   Head circumference:  38.1 cm       Skin to Skin and Feeding Plan    Skin to skin initiation date/time: 1841    Skin to skin with:  Mother  Skin to skin end date/time:        Labor Events and Shoulder Dystocia    Fetal Tracing Prior to Delivery:  Category " 2  Shoulder dystocia present?:  Neg     Delivery (Maternal) (Provider to Complete) (584876)    Episiotomy:  None  Perineal lacerations:  2nd Repaired?:  Yes   Periurethral laceration:  right Repaired?:  No   Sulcus laceration:  bilateral Repaired?:  Yes      Blood Loss  Mother: Magaly Jensen #[id number]   Start of Mother's Information    IO Blood Loss  02/27/19 2240 - 02/28/19 0208    None           End of Mother's Information  Mother: Magaly Jensen #[id number]         Delivery - Provider to Complete (585063)    Delivering clinician:  Rae Fitzpatrick MD  Attempted Delivery Types (Choose all that apply):  Spontaneous Vaginal Delivery  Delivery Type (Choose the 1 that will go to the Birth History):  Vaginal, Spontaneous          Placenta    Delayed Cord Clamping:  Done  Date/Time:  2/28/2019 12:31 AM  Removal:  Expressed  Disposition:  Hospital disposal     Anesthesia    Method:  Local          Presentation and Position    Presentation:  Vertex  Position:  Right Occiput Anterior           Rae Fitzpatrick MD

## 2019-02-28 NOTE — PLAN OF CARE
Patient vitally stable, IV SL, Gallego patent and draining adequate amounts of yellow urine. Tolerating regular diet, has not been OOB since transfer, declined trial of ambulation. Postpartum checks WDL. Pain adequately managed with prn Ibuprofen, ice pack to perineum, patient declined offered prn Tylenol. Patient states she will breast and bottle feed, infant has only been bottle fed this shift.  present and supportive, performing all infant cares. Plan for SW consult.

## 2019-02-28 NOTE — PLAN OF CARE
Pt able to get some rest since transfer to unit.  Denies pain at this time.  Ksenia area swollen; ice pack applied.  Gallego patent w/ clear, yellow urine.  Spouse present and helpful w/ cares.

## 2019-02-28 NOTE — PROVIDER NOTIFICATION
02/27/19 2259   Provider Notification   Provider Name/Title Dr. Gates   Method of Notification At Bedside   Request Evaluate in Person   Notification Reason Status Update   MD at bedside to discuss POC with patient. MD reiview FHT's and contraction pattern.

## 2019-02-28 NOTE — PROVIDER NOTIFICATION
02/27/19 2151   Provider Notification   Provider Name/Title Dr. Gates   Method of Notification Phone   Request Evaluate - Remote   Notification Reason Status Update   MD updated on patient arrival, patient grossly ruptured, patient hepatitis B carrier, GBS not seen in prenatal, gestational diabetic diet controlled. Orders received for intrapartum orders and gestational diabetic order set.

## 2019-02-28 NOTE — PROVIDER NOTIFICATION
02/28/19 0413   Provider Notification   Provider Name/Title Dr. Doss   Method of Notification In Department   Request Evaluate-Remote   Notification Reason Other   Patient unable to void. Postpartum check firm w/o massage, deviated to right. Patient has history of being unable to void after first delivery for 2 week per patient. Orders received for mast catheter to be placed.

## 2019-02-28 NOTE — PROGRESS NOTES
Patient with intermittent pressure.  8/100/-1     Category I FHT, will start low dose pitocin augmentation.  Patient is unmedicated at this time.     Rae Gates MD   Pager: [phone]   February 28, 2019, 12:07 AM

## 2019-02-28 NOTE — PROGRESS NOTES
[facility]  PPD# 1    Pt doing well. Ambulating, voiding, tolerating PO. Decreased lochia. Pain controlled. Breast feeding and bottle feeding. Coping well with infant. Pt appears to comprehend English pretty well although she does not speak much English. Denies any Depression symptoms. Undecided regarding type of birth control. Has not walked much since she just delivered this early morning. No other questions or complaints.     Vitals:    19 0350 19 0425 19 0650 19 0832   BP: 102/67 110/65 121/68 114/73   Resp:   18 18   Temp:   98.4  F (36.9  C) 98  F (36.7  C)   TempSrc:   Oral Oral     Abd soft, appropriately tender, nondistended, FFBU, no fundal tenderness  Ext 1+ edema bilat LE, no CT BL    Lab Results   Component Value Date    HGB 14.5 2019       A/P 38 year old  at 40w1d s/p  PPD# 1. Pt had prior pregnancy complicated by urinary retention, pt has hx of horseshoe kidney, GDMA1 well controlled, Hep B carrier, language barrier.    1) Postpartum   -Continue routine post-partum care.  - Gallego catheter to keep in place, will attempt trial of voiding this evening or tomorrow morning depneding on pt's evolution  - encourage ambulation  - regular diet  - pain control with motrin and Tylenol PRN  - Hep B carrier- PCR still pending  - significant EBL during delivery   - hemodynamically stable   - Hb to be done tomorrow morning, prior to admission was 14.5  - BS WNL  - will need 2 hr GTT between 6-12 wks PP    Dr. Treva Aguilera  [phone]  2019 11:02 AM

## 2019-02-28 NOTE — LACTATION NOTE
This note was copied from a baby's chart.  LC visit.  Her baby has been both breast and bottle feeding.  LC discussed the risks of early formula feeding and possible low milk supply as she had with her first baby.  She plans to continue to use formula.  LC encouraged her to place infant to both sides prior to formula.  She has no questions and feels confident with latch, stating that she does not need any assistance.  She is aware she may call prn.

## 2019-02-28 NOTE — H&P
[facility] Labor and Delivery History and Physical    Clyde Jensen MRN# [id number]   Age: 38 year old YOB: 1980     Date of Admission:  2019           HPI:   Clyde Jensen is a 38 year old  at 40w0d by LMP c/w 8w4d US admitted for SROM around 2130 this evening, clear fluid.  She denies any vaginal bleeding. She denies any regular, painful contractions but more so now since admission.  Good fetal movement.     Pregnancy c/b:   - GDMA1  - AMA; NIPT normal, girl!  - Hep B carrier s/p GI consultation on   - Horseshoe kidney with previous episode of urinary retention  - Traveled to [facility] area ([facility]) in pregnancy; testing negative   - Poor social situation            Pregnancy history:     OBSTETRIC HISTORY:  Obstetric History       T1      L1     SAB0   TAB0   Ectopic0   Multiple0   Live Births1       # Outcome Date GA Lbr Hernan/2nd Weight Sex Delivery Anes PTL Lv   2 Current            1 Term 17 39w2d 06:03 / 00:16 3.11 kg (6 lb 13.7 oz) F Vag-Spont Local N PRAVIN      Name: NUBIA,BABY1 CLYDE      Apgar1:  9                Apgar5: 9          EDC: Estimated Date of Delivery: 2019    Prenatal Labs:   Lab Results   Component Value Date    ABO B 2017    RH  Pos 2017    HEPBANG Positive 10/03/2016    TREPAB Negative 2017    HGB 14.5 2019       GBS Status:   GBS negative on 19    Ultrasounds:  Growth US on 19:   IMPRESSION:   1. Single living intrauterine pregnancy with ultrasound gestational age of 36 weeks 3 days.  2. Normal amniotic fluid volume.  3. Estimated fetal weight is 3063 g which is at the 21.3 percentile.    Result Narrative   COMPARISON:  01/10/2019.    TECHNIQUE: Limited ultrasound examination was performed for evaluation of fetal growth, amniotic fluid index (BORIS).    Type of Gestation:  Pitts.  Fetal Presentation:  CEPHALIC  Fetal Movement Present:  Yes  Cardiac Rate:  153 bpm and is regular  4-quadrant  BORIS: 15.5 cm. Normal.  Q1: 4.2 cm.  Q2: 3.6 cm.  Q3: 2.3 cm.  Q4: 5.5 cm.    Placental Position: ANTERIOR. Normal.  Cervical Length (cm):  Not visualized    Fetal Measurements (Source [place]lock):    BPD:  8.9 cm = 36w2d   HC: 33.0 cm = 37w4d   AC:  33.4 cm = 37w2d   FL:  7.0 cm = 36w0d     Anatomic Ratios:  Within normal limits.   Abdominal Circumference Percentile: 27.5%    Estimated Fetal Weight:  3063 grams  Weight Percentile: 21.3%    Other Findings: None.    GA by LMP:  38w6d   GA by Prior US:  38w4d  GA by today's US:  36w3d   SURINDER by today's US:  2019/03/16           Maternal Past Medical History:     Past Medical History:   Diagnosis Date     Hepatitis B      Horseshoe kidney      Past Surgical History:   Procedure Laterality Date     RHINOPLASTY        Medications Prior to Admission   Medication Sig Dispense Refill Last Dose     Prenatal Vit-Fe Fumarate-FA (PRENATAL MULTIVITAMIN  PLUS IRON) 27-0.8 MG TABS per tablet Take 1 tablet by mouth daily   2/26/2019 at Unknown time     ibuprofen (ADVIL/MOTRIN) 400 MG tablet Take 1-2 tablets (400-800 mg) by mouth every 6 hours as needed for other (cramping) 60 tablet 0      Misc. Devices (BREAST PUMP) MISC 1 each as needed 1 each 0      senna-docusate (SENOKOT-S;PERICOLACE) 8.6-50 MG per tablet Take 1-2 tablets by mouth 2 times daily 60 tablet 0            Family History:   family history is not on file. Patient unsure of bleeding or clotting history          Social History:     Social History     Tobacco Use     Smoking status: Never Smoker     Smokeless tobacco: Never Used   Substance Use Topics     Alcohol use: No            Review of Systems:   The Review of Systems is negative other than noted in the HPI          Physical Exam:     Patient Vitals for the past 8 hrs:   BP Temp Temp src Resp   02/27/19 2204 116/74 98.4  F (36.9  C) Oral 18     Gen: Pleasant, NAD   CV:  Regular rate and rhythm, no murmurs, rubs or gallops appreciated   Resp: Non-labored breathing.   Lungs clear to ausculation bilaterally   Abd: Soft, non-tender and non-distended   Ext: Trace pedal edema bilaterally     Cervix: 4/90%/-2 at 2240 per RN  Membranes: Grossly ruptured   EFW: 6.5 lbs.  Presentation:Cephalic on SVE    Fetal Heart Rate Tracing:   Baseline 140  Variability: Moderate  Accelerations: Present  Decelerations: None  Interpretation: reactive    Contractions: q 2-4 min per EFM    Labs:   Component      Latest Ref Rng & Units 2019   WBC      4.0 - 11.0 10e9/L 10.4   RBC Count      3.8 - 5.2 10e12/L 4.70   Hemoglobin      11.7 - 15.7 g/dL 14.5   Hematocrit      35.0 - 47.0 % 44.4   MCV      78 - 100 fl 95   MCH      26.5 - 33.0 pg 30.9   MCHC      31.5 - 36.5 g/dL 32.7   RDW      10.0 - 15.0 % 14.2   Platelet Count      150 - 450 10e9/L 213   Bilirubin Direct      0.0 - 0.2 mg/dL <0.1   Bilirubin Total      0.2 - 1.3 mg/dL 0.2   Albumin      3.4 - 5.0 g/dL 2.7 (L)   Protein Total      6.8 - 8.8 g/dL 7.5   Alkaline Phosphatase      40 - 150 U/L 116   ALT      0 - 50 U/L 49   AST      0 - 45 U/L 37   Glucose      70 - 99 mg/dL 71         Assessment:   Magaly Jensen is a 38 year old  at 40w0d admitted for SROM, now in latent labor.        Plan:     Labor:   - SROM at 2130 notable for clear fluid, SVE on admission of 3 then 4 cm. Patient laboring spontaneously, if contractions space, will start Pitocin augmentation.   Pain: Per patient desires, currently declining     FWB:   - Continous EFM   - Category I FHT, reactive  - Growth US on : Vtx, Ant Plac, EFW 21%ile, AC 27%ile    GDMA1:  - Glucose check per protocol, insulin gtt if BG >110 in active labor     Hepatitis B carrier:   - S/p GI consultation on   - Admit LFT, CBC and Hep B PCR ordered   - Pediatrician team to be made aware     Other:   - Rh positive, RI, placenta anterior, EFW 6.5-7#   - Language barrier; Cambodian speaking.  Patient currently declining .  - GBS negative   - AMA; NIPT normal, girl!  - Horseshoe  kidney with previous episode of urinary retention  - Traveled to [place] area ([place]) in pregnancy; testing negative   - Poor social situation;  consultation PP     Rae Gates MD   Pager: [phone]   February 27, 2019

## 2019-02-28 NOTE — PROVIDER NOTIFICATION
02/27/19 9591   Provider Notification   Provider Name/Title Dr. Gates   Method of Notification Phone   Request Evaluate - Remote   Notification Reason Status Update   MD updated on FHT's with intermittent LD and position change performed, SVE, BS, patient getting more uncomfortable, patient declining  at this time, and patient declining pain medication at this time. MD will come see patient.

## 2019-02-28 NOTE — PROVIDER NOTIFICATION
02/28/19 0005   Provider Notification   Provider Name/Title Dr Fitzpatrick   Method of Notification At Bedside   Request Evaluate in Person   Notification Reason SVE;Labor Status   at bedside to evaluate. SVE 8/100/-1. Orders received to start Pitocin. POC discussed with patient and spouse.

## 2019-02-28 NOTE — PROGRESS NOTES
PHN met with patient and provided [facility] Resource information for Public Health Nurse visits, WIC, and Child and Teen Checkups. Discussed how to add baby to insurance, when baby needs to go in for well child visits and car seats available through insurance. Instructed that [facility] home visiting PHN can assist in connecting to resources for baby supplies, food ,and financial.

## 2019-02-28 NOTE — PLAN OF CARE
Data: Magaly Jensen transferred to 443 via wheelchair at 0440. Baby transferred via parent's arms.  Action: Receiving unit notified of transfer: Yes. Patient and family notified of room change. Report given to Shakira RN at 0505. Belongings sent to receiving unit. Accompanied by Registered Nurse. Oriented patient to surroundings. Call light within reach. ID bands double-checked with receiving RN.  Response: Patient tolerated transfer and is stable.

## 2019-03-01 LAB
HBV DNA SERPL NAA+PROBE-ACNC: 2110 [IU]/ML
HBV DNA SERPL NAA+PROBE-LOG IU: 3.3 {LOG_IU}/ML
HGB BLD-MCNC: 11.3 G/DL (ref 11.7–15.7)

## 2019-03-01 PROCEDURE — 36415 COLL VENOUS BLD VENIPUNCTURE: CPT | Performed by: OBSTETRICS & GYNECOLOGY

## 2019-03-01 PROCEDURE — 12000000 ZZH R&B MED SURG/OB

## 2019-03-01 PROCEDURE — 85018 HEMOGLOBIN: CPT | Performed by: OBSTETRICS & GYNECOLOGY

## 2019-03-01 PROCEDURE — 25000132 ZZH RX MED GY IP 250 OP 250 PS 637: Performed by: OBSTETRICS & GYNECOLOGY

## 2019-03-01 RX ADMIN — IBUPROFEN 800 MG: 800 TABLET ORAL at 07:14

## 2019-03-01 RX ADMIN — SENNOSIDES AND DOCUSATE SODIUM 1 TABLET: 8.6; 5 TABLET ORAL at 09:54

## 2019-03-01 RX ADMIN — IBUPROFEN 800 MG: 800 TABLET ORAL at 14:33

## 2019-03-01 RX ADMIN — IBUPROFEN 800 MG: 800 TABLET ORAL at 22:00

## 2019-03-01 RX ADMIN — SENNOSIDES AND DOCUSATE SODIUM 1 TABLET: 8.6; 5 TABLET ORAL at 22:00

## 2019-03-01 NOTE — CONSULTS
[facility]  MATERNAL CHILD HEALTH   INITIAL NICU PSYCHOSOCIAL ASSESSMENT     DATA:     Reason for Social Work Consult:  Insurance/Financial Issues    Presenting Information: Pt is a 38-year-old, who recently delivered her second child, baby girl Valentina, on 2/28.    Living Situation: [place] her significant other and 2-year-old daughter.     Social Support: Pt reports her significant other Calin and her father being positive supports for her.     Employment: Pt is employed full-time, and reports that she has 3 months off of work. Her significant other Annmarie works full-time as well, night shift.     Insurance: Pt has Medicaid insurance listed. Pt showed SW proof of insurance -[employer] and [employer]. Noted no concerns with insurance.    Source of Financial Support: Pt and FOB Annmarie are employed full-time. Pt did not report any financial concerns.      Mental Health History: No mental health history noted.     History of Postpartum Mood Disorders: Pt denies and history of postpartum mood disorders after delivering her first child. Reports not currently experiencing and PPD symptoms.     Chemical Health History: None noted    Community Resources//Baby Supplies: Pt reports that they are prepared at home for the baby. Pt is enrolled in [employer] and a PHN referral has been made by PHN. Pt reports that she has a car seat at home that she used for her first child. Plans to bring baby home with that carseat and call insurance for a new car seat. FOB and pt's father plan to provide  for baby Valentina once pt Magaly returns to work after 12 weeks.       INTERVENTION:       DELIA completed chart review and collaborated with the multidisciplinary team.     Psychosocial Assessment     Introduction to Maternal Child Health  role and scope of practice     Reviewed Hospital and Community Resources     Assessed Chemical Health History and Current  Symptoms     Assessed Mental Health History and Current Symptoms     Identified stressors, barriers and family concerns     Provided support and active empathetic listening and validation.     Provided psychoeducation on  mood and anxiety disorders, assessed for any current symptoms or history    Provided brochure Depression and Anxiety During and after Pregnancy.     ASSESSMENT:     Coping: Adequate    Mood:  Appropriate, stable, calm    Motivation/Ability to Access Services: Independent in accessing services    Assessment of Support System: Stable, involved, appropriate    Level of engagement with SW: Engaged and appropriate. Able to seek out SW when needs arise.     Family and parent/infant interactions: MOB attentive to baby. FOB not present during SW assessmentn)    Strengths: Caring family, willingness to accept help    Identified Barriers: None at this time    PLAN:     SW will continue to follow throughout pt's Maternal-Child Health Journey as needs arise. SW will continue to collaborate with the multidisciplinary team.    EDILSON Tsang

## 2019-03-01 NOTE — PLAN OF CARE
Instructed patient to call nurse with each void, so we can measure individual voids, and check residual with bladder scanner. She verbalized understanding.

## 2019-03-01 NOTE — PLAN OF CARE
Pt stable, vitals WNL. Ambulating ind. Pt has mast in per MD. Urine output adequate. Significant other is supportive at bedside.

## 2019-03-01 NOTE — PLAN OF CARE
Patient is stable, voiding in 300 ml amounts, answers yes when asked if she feels like she is emptying her bladder, affect bright, gaining strength and independence.

## 2019-03-01 NOTE — PROGRESS NOTES
PPD #1    No complaints, doing well.  Feels she may be for discharge today.  Has voided x 2, feels ok.    OBJECTIVE:  Blood pressure 107/72, temperature 98  F (36.7  C), temperature source Oral, resp. rate 16, last menstrual period 2018, unknown if currently breastfeeding.    WDWN woman in NAD.  FF U -1    Hemoglobin   Date Value Ref Range Status   2019 11.3 (L) 11.7 - 15.7 g/dL Final   2019 14.5 11.7 - 15.7 g/dL Final       ASSESSMENT:  S/P  doing well  PPD #1  Active Problems:    ROM (rupture of membranes), premature (2019)  S/p urinary retention, now voiding well.    PLAN:  OK to discharge home tomorrow.  Post partum restrictions and what to expect in puerperium reviewed. Rx Ibuprofen OTC.  RTC 6 weeks for post partum cares, pelvic rest until then.  GTT at 6 week visit.    Gretchen Martin MD 2019

## 2019-03-01 NOTE — PLAN OF CARE
Pt stable and doing well. Ambulating independently, UOP during the night was adequate. Gallego removed at 0630, awaiting void.  Using ice and tucks to perineum. Spouse present and supportive, bonding well.

## 2019-03-02 VITALS
RESPIRATION RATE: 16 BRPM | SYSTOLIC BLOOD PRESSURE: 114 MMHG | HEART RATE: 87 BPM | TEMPERATURE: 98.4 F | DIASTOLIC BLOOD PRESSURE: 76 MMHG

## 2019-03-02 PROCEDURE — 25000132 ZZH RX MED GY IP 250 OP 250 PS 637: Performed by: OBSTETRICS & GYNECOLOGY

## 2019-03-02 RX ORDER — IBUPROFEN 600 MG/1
600 TABLET, FILM COATED ORAL EVERY 6 HOURS PRN
Qty: 30 TABLET | Refills: 0 | OUTPATIENT
Start: 2019-03-02 | End: 2024-08-08

## 2019-03-02 RX ADMIN — IBUPROFEN 800 MG: 800 TABLET ORAL at 06:49

## 2019-03-02 RX ADMIN — SENNOSIDES AND DOCUSATE SODIUM 1 TABLET: 8.6; 5 TABLET ORAL at 08:18

## 2019-03-02 NOTE — PLAN OF CARE
Patient unable to fully empty bladder. PVR 265mls, after 300ml void, next PVR 332mls after 460 void. Patient will be straight cathed.  present for instructions and treatment plan. Fundus is to the right and U1. Denies pain. VSS. Is bonding well with infant and performing all cares. Formula feeding.  Spouse is now at bedside and supportive.     no

## 2019-03-02 NOTE — PLAN OF CARE
Patient feels like she is emptying bladder.  Voided 300mls, and fundus is now midline and U1.  Bladder scanner not working, no PVR performed. Previously fundus was to the right.  No noted distention.

## 2019-03-02 NOTE — DISCHARGE SUMMARY
[facility] Discharge Summary    Magaly Jensen MRN# [id number]   Age: 38 year old YOB: 1980     Date of Admission:  2019  Date of Discharge::  3/2/2019  Admitting Physician:  Rae Fitzpatrick MD  Discharge Physician:  Cesar Singh     [place] clinic: [facility]          Admission Diagnoses:   ROM (rupture of membranes), premature          Discharge Diagnosis:   Normal spontaneous vaginal delivery  Intrauterine pregnancy at 40 weeks gestation          Procedures:   Procedure(s): No additional procedures performed                  Medications Prior to Admission:   None          Discharge Medications:     Current Facility-Administered Medications   Medication     acetaminophen (TYLENOL) tablet 650 mg     bisacodyl (DULCOLAX) Suppository 10 mg     carboprost (HEMABATE) injection 250 mcg     hydrocortisone 2.5 % cream     ibuprofen (ADVIL/MOTRIN) tablet 800 mg     lactated ringers BOLUS 1,000 mL     lanolin ointment     methylergonovine (METHERGINE) injection 200 mcg     misoprostol (CYTOTEC) tablet 400 mcg     naloxone (NARCAN) injection 0.1-0.4 mg     nitrous oxide/oxygen 50/50 blend     No MMR Needed - Assessment: Patient does not need MMR vaccine     NO Rho (D) immune globulin (RhoGam) needed - mother Rh POSITIVE     No Tdap Needed - Assessment: Patient does not need Tdap vaccine     oxyCODONE (ROXICODONE) tablet 5 mg     oxytocin (PITOCIN) 30 units in 500 mL 0.9% NaCl infusion     oxytocin (PITOCIN) 30 units in 500 mL 0.9% NaCl infusion     oxytocin (PITOCIN) injection 10 Units     senna-docusate (SENOKOT-S/PERICOLACE) 8.6-50 MG per tablet 1 tablet    Or     senna-docusate (SENOKOT-S/PERICOLACE) 8.6-50 MG per tablet 2 tablet     sodium phosphate (FLEET ENEMA) 1 enema     tranexamic acid (CYKLOKAPRON) infusion 1 g             Consultations:   No consultations were requested during this admission          Brief History of Labor:       Magaly Jensen is a 38 year old  who was  admitted at 40w0d for SROM, clear fluid around 2130.  Shortly after arrival, she had onset of spontaneous labor.  She progressed normally and was found to be complete at  0020 at which time she started active second stage.  During pushing, it was noted that the perineal body was extremely thin.   She pushed effectively and delivered a viable female infant at 0028 with APGARs of 8 and 9 respectively.  No nuchal cord, however, compound arm was noted.  Spontaneously delivery of an intact placenta with a 3-V cord ensued shortly thereafter.  She received 30 units of IV pitocin as a uterotonic agent. Exam of the perineum revealed a complex second degree perineal laceration with bilateral sulcus extensions and avulsion of the left hymenal ring and labia in a zig-zag pattern.  There was also a hemostatic right periurethral laceration which was not repaired.  Following infiltration of local anesthetic and IV fentanyl, this complex perineal laceration was repaired using three, 3-0 Vicryl sutures which took >60 minutes to repair.  During this time, there was ongoing oozing from the perineal and sulcal lacerations attributing to the blood loss.  The patient tolerated the repair well.  She will be scheduled on a bowel regimen with plans to follow-up in clinic in 2 weeks.  EBL 1000cc, QBL pending.           Hospital Course:   The patient's hospital course was unremarkable.  On discharge, her pain was well controlled. Vaginal bleeding is similar to peak menstrual flow.  Voiding without difficulty.  Ambulating well and tolerating a normal diet.  No fever.  Breastfeeding well.  Infant is stable.   She was discharged on post-partum day #2.    Post-partum hemoglobin:   Hemoglobin   Date Value Ref Range Status   03/01/2019 11.3 (L) 11.7 - 15.7 g/dL Final             Discharge Instructions and Follow-Up:   Discharge diet: Regular   Discharge activity: Activity as tolerated   Discharge follow-up: Follow up with primary care provider in 6  weeks   Wound care: Drink plenty of fluids             Discharge Disposition:   Discharged to home      Attestation:  I have reviewed today's vital signs, notes, medications, labs and imaging.    Cesar Singh

## 2019-03-02 NOTE — PLAN OF CARE
Clarified with Dr Singh re: strait cath to send home. Per Dr Singh, orders to send pt home with one straight catheter kit to use if needed for urinarty retention at home as this happened with her last delivery. Pt has been taught in the past how to perform a catheter on herself. Pt knows that if she needs to use this at home, it would be a one time use and then to go to the clinic as soon as able.

## 2019-03-02 NOTE — DISCHARGE INSTRUCTIONS
Postpartum Vaginal Delivery Instructions    Activity       Ask family and friends for help when you need it.    Do not place anything in your vagina for 6 weeks.    You are not restricted on other activities, but take it easy for a few weeks to allow your body to recover from delivery.  You are able to do any activities you feel up to that point.    No driving until you have stopped taking your pain medications (usually two weeks after delivery).     Call your health care provider if you have any of these symptoms:       Increased pain, swelling, redness, or fluid around your stiches from an episiotomy or perineal tear.    A fever above 100.4 F (38 C) with or without chills when placing a thermometer under your tongue.    You soak a sanitary pad with blood within 1 hour, or you see blood clots larger than a golf ball.    Bleeding that lasts more than 6 weeks.    Vaginal discharge that smells bad.    Severe pain, cramping or tenderness in your lower belly area.    A need to urinate more frequently (use the toilet more often), more urgently (use the toilet very quickly), or it burns when you urinate.    Nausea and vomiting.    Redness, swelling or pain around a vein in your leg.    Problems breastfeeding or a red or painful area on your breast.    Chest pain and cough or are gasping for air.    Problems coping with sadness, anxiety, or depression.  If you have any concerns about hurting yourself or the baby, call your provider immediately.     You have questions or concerns after you return home.     Keep your hands clean:  Always wash your hands before touching your perineal area and stitches.  This helps reduce your risk of infection.  If your hands aren't dirty, you may use an alcohol hand-rub to clean your hands. Keep your nails clean and short.    Chanthim-    Congratulations on your new baby!    A few instructions:    -No tampons/douching/intercourse x 6 weeks.  See your health care provider first, to be sure  "you are healed.  -If you have stitches, it may help you feel better sooner ifyou can sit in the tub twice daily for a few minutes, for the first week after delivery.  Keep the area clean and dry.  -Call your clinic if fever, worsening pain, or any questions.    Medications:  Ibuprofen 600 mg every 6 hours will be helpful for pain and cramping.  You may want to use a stool softener if needed, available over the counter, like Colace 100 mg twice daily.    Post partum depression:   10-15% of women will have more than the baby blues--if you are feeling very sad or down, having regrets, can't stop thinking about certain thoughts or actions, you could be experiencing post partum depression.  Please call your health care provider to discuss.    Follow up:  Please call your clinic soon to schedule a \"post partum visit\" for 2 weeks and at 6 weeks from now.   If you need anything by prescription for contraception, they will go over it with you at that visit.  Also, you may be tested again to be sure the gestational diabetes is gone.    Again, congratulations!      Gretchen Martin MD March 1, 2019       Will need 2 hr Glucose testing between 6-12 wks Postpartum            "

## 2019-03-02 NOTE — PLAN OF CARE
VSS. Using ice and tucks to perineum. Post void residual 289 ml, after 350 ml void. Encouraged patient to empty bladder often. FF/U1. Will continue to monitor. Independent with  cares. SO at bedside, supportive.

## 2019-03-02 NOTE — PLAN OF CARE
Patient states understanding of education, discharge teaching, instructions, follow-up plan of care, and denies any further questions of me at the time of discharge.  here this am to assist with discharge process. Patient affect is bright, and she is discharged in stable condition per wheel chair with baby and FOB.

## 2019-03-02 NOTE — PROGRESS NOTES
[facility] Obstetric[facility] Post-Partum Progress Note          Assessment and Plan:    Assessment:   Post-partum day #2  Normal spontaneous vaginal delivery  History of urinary retention with last pregnancy.  Has had multiple trial of voids and varies from 150 to 300ml  Last baby mom  Returned to ER after discharge and had leg bag placed  L&D complications: None      Doing well.  No excessive bleeding  Pain well-controlled.      Plan:   Discharge later today  Pt has straight cathed before and will discharge with cath as a precautionary use knowing to return to clinic day after having to straight cath.           Interval History:   Doing well.  Pain is adequately controlled.  No fevers.  No history of foul-smelling vaginal discharge.  Good appetite.  Denies chest pain, shortness of breath, nausea or vomiting.  Vaginal bleeding is similar to a heavy menstrual flow.  Breastfeeding well.          Significant Problems:    None          Review of Systems:    The patient denies any chest pain, shortness of breath, excessive pain, fever, chills, purulent drainage from the wound, nausea or vomiting.          Medications:   All medications have been reviewed          Physical Exam:   All vitals stable  Uterine fundus is firm, non-tender and at the level of the umbilicus          Data:   All laboratory data reviewed    Cesar Singh

## 2023-05-22 ENCOUNTER — HOSPITAL ENCOUNTER (EMERGENCY)
Facility: CLINIC | Age: 43
Discharge: HOME | End: 2023-05-22
Attending: EMERGENCY MEDICINE | Admitting: EMERGENCY MEDICINE
Payer: COMMERCIAL

## 2023-05-22 VITALS
WEIGHT: 120 LBS | HEART RATE: 83 BPM | BODY MASS INDEX: 25.19 KG/M2 | HEIGHT: 58 IN | TEMPERATURE: 98.1 F | OXYGEN SATURATION: 99 % | DIASTOLIC BLOOD PRESSURE: 74 MMHG | SYSTOLIC BLOOD PRESSURE: 119 MMHG | RESPIRATION RATE: 16 BRPM

## 2023-05-22 DIAGNOSIS — H00.014 HORDEOLUM EXTERNUM LEFT UPPER EYELID: ICD-10-CM

## 2023-05-22 DIAGNOSIS — H02.9 LESION OF LEFT UPPER EYELID: ICD-10-CM

## 2023-05-22 PROCEDURE — 99283 EMERGENCY DEPT VISIT LOW MDM: CPT

## 2023-05-22 RX ORDER — TETRACAINE HYDROCHLORIDE 5 MG/ML
SOLUTION OPHTHALMIC
Status: DISCONTINUED
Start: 2023-05-22 | End: 2023-05-22 | Stop reason: HOSPADM

## 2023-05-22 RX ORDER — ERYTHROMYCIN 5 MG/G
0.5 OINTMENT OPHTHALMIC 4 TIMES DAILY
Qty: 10 G | Refills: 0 | Status: SHIPPED | OUTPATIENT
Start: 2023-05-22 | End: 2023-05-27

## 2023-05-22 ASSESSMENT — ACTIVITIES OF DAILY LIVING (ADL): ADLS_ACUITY_SCORE: 35

## 2023-05-22 ASSESSMENT — VISUAL ACUITY
OS: 20/25
OD: 20/30

## 2023-05-22 NOTE — ED PROVIDER NOTES
Emergency Department Attending Supervision Note  5/22/2023  7:44 AM      I evaluated this patient in conjunction with Romi Benson PA-C    Briefly, the patient presented with right sided eye pain.  Patient notes 2 weeks of discomfort and a feeling of something poking her right eye from beneath eye lid.  Notes occasional watery drainage from eye.  No relief from OTC eye drops.  Notes blurriness in vision, though denies any vision loss.  No fevers or chills.  Does not wear contacts or glasses.  Notes some tenderness along medial aspect of left eye near nasal bridge.  No hx of facial trauma or injury.  PMH notable for prior facial plastic surgery in 2011 in [facility], without issues since that time.     External records reviewed including office visit note from 5/10/2023 with a diagnosis of UTI for which she was prescribed Macrobid.    On my exam:  General:   Well-nourished   Speaking in full sentences   Resting comfortably on gurney  Eyes:   VA: 20/30 on R, 20/25 on L   Pupils are 4 mm on R, 4 mm on L   No RAPD   Anisocoria absent   Ptosis absent   Proptosis absent   EOM are full without entrapment   Conjunctiva without injection   Upper lid with stye noted to mid aspect of lid   Eversion of upper lid reveals erythematous region of localized swelling and discomfort, no vesicle   IOP: 16 on R   Slit Lamp Exam:  No foreign body visualized, no region of increased fluorescein uptake, negative Melida sign, no cell and flare  ENT:   Moist mucous membranes   Posterior oropharynx clear without erythema or exudate   Mild tenderness to palpation along right aspect of nasal bridge without overlying skin changes or localized swelling  Neck:   Supple with full ROM  Resp:   Lungs CTAB   No crackles, wheezing or audible rubs   Good air movement  CV:    Normal rate, regular rhythm   S1 and S2 present   No murmur, gallop or rub  GI:   BS present   Abdomen soft without distention   Non-tender to light and deep palpation   No guarding  "or rebound tenderness  Skin:   Warm, dry, well perfused   No rashes or open wounds on exposed skin  MSK:   Moves all extremities   No focal deformities or swelling  Neuro:   Alert   Answers questions appropriately   Moves all extremities equally   Gait stable  Psych:   Normal affect, normal mood    My impression is stye of left upper eyelid, as well as lesion inferior to upper eyelid.  Dedicated ocular exam without evidence of ocular FB, corneal abrasion, nor dendritic appearance to suggest herpetic infection.  No significant conjunctival injection to suggest conjunctivitis, episcleritis nor scleritis.  IOP are normal, arguing against acute angle closure glaucoma.  No vision loss to suggest retinal pathology nor CRAO/CRVO.  No hx of ocular trauma.  Eversion of upper lid reveals isolated lesion beneath upper lid.  Precise etiology not clear, though this coincides with location of patient's discomfort, and likely causing feeling as though something is \"poking\" her eye.  Will initiate antibiotic ointment and plan close follow-up with ophthalmology for re-assessment.  No signs of pre-septal nor orbital cellulitis.  Nasal bridge pain may be related to lacrimal duct dysfunction, particularly given history of more frequent tearing.  No hx of vision loss, and given exam findings as well as absence of RAPD, doubt retro-orbital process such as optic neuritis, retro-orbital hematoma, among others and feel further advanced imaging can be deferred safely.  Patient updated and comfortable with outlined plan of care.  Questions answered prior to DC.        Diagnosis    ICD-10-CM    1. Hordeolum externum left upper eyelid  H00.014       2. Lesion of left upper eyelid  H02.9     internal            Jose D Dutton MD Roach, Brian Donald, MD  05/22/23 2038    "

## 2023-05-22 NOTE — DISCHARGE INSTRUCTIONS
Apply antibiotic ointment to lash line 4x daily for 5 days  Follow up with your eye doctor in the next 3-5 days  Warm compresses / hot wash cloths applied 4-5 times a day to the affected eye to encourage drainage

## 2023-05-22 NOTE — ED PROVIDER NOTES
"    History     Chief Complaint:  Eye Problem     HPI   Magaly Jensen is a 42 year old female who presents with right eye blurriness and pain + pain between her eyebrows x2 weeks that is worsening. She reports her tight eye has been blurring and she feels like there's something insider her upper eyelid. Her left eye is normal.   She had plastic surgery of her nose in [facility] in 2011, over 12 years ago that has not been a problem until recently. She has been trying dry eyes eye drops without relief.  Denies fevers or chills. She does not wear contacts. She sees an eye doctor yearly.     Independent Historian:    None    Review of External Notes: None    ROS:  Review of Systems    Allergies:  No Known Allergies     Medications:    Viread     Past Medical History:    Hepatitis B   Horseshoe Kidney     Past Surgical History:    Rhinoplasty     Family History:    Sister - Hepatitis B     Social History:  PCP: Rae Fitzpatrick   The patient presents to the ED alone via private vehicle   [facility]   Culture barrier     Physical Exam     Patient Vitals for the past 24 hrs:   BP Temp Temp src Pulse Resp SpO2 Height Weight   05/22/23 0924 119/74 98.1  F (36.7  C) Oral 83 16 99 % -- --   05/22/23 0737 119/86 97  F (36.1  C) -- 85 16 100 % 1.473 m (4' 10\") 54.4 kg (120 lb)      Physical Exam  Vital signs and nursing notes reviewed.    General: Alert and oriented. No acute distress. Nontoxic appearance.  Head: No signs of trauma.   Mouth/Throat: Oropharynx moist.   Eyes: Conjunctivae are normal. Pupils are equal. EOM normal. Visual fields intact by confrontation.  Visual acuity: 20/30 on right, 20/25 on left.  Right eye: Nodule on inside of upper lid with central head that could be internal hordeolum versus other lesion. Small white head at lash line on external upper lid as well indicative of external hordeolum.  Slit lamp exam: No foreign body visualized, no region of increased fluorescein " uptake.  Nose: Mildly tender to palpation of  right side of bridge of nose. No significant fluctuance or erythema to suggest abscess or skin concern.  Neck: Normal range of motion.    CV: Appears well perfused.  Resp: Normal effort of breathing. No respiratory distress.   MSK: Normal range of motion. No obvious deformity.   Neuro: The patient is alert and interactive. Speech normal. GCS 15  Skin: No lesions or sign of trauma noted.   Psych: Normal mood and affect, and behavior.    Emergency Department Course   Emergency Department Course & Assessments:    Interventions:  None      Independent Interpretation (X-rays, CTs, rhythm strip):  N/A     Consultations/Discussion of Management or Tests:  None    Social Determinants of Health affecting care:  Language barrier   Cultural barrier     Assessments:  0747 I obtained history and examined the patient as noted above.  0851 I rechecked the patient with Dr. Marks explained findings. I discussed plan for discharge home.    Disposition:  The patient was discharged to home.     Impression & Plan    Medical Decision Making:  Magaly Jensen is a 42 year old female who presents for evaluation of right eye blurriness and discomfort and concern of pain in the right glabella region/side of nose. See HPI. Vital signs stable. Presentation and findings consistent with hordeolum.  In regards to her right eye, visual acuity was assessed and normal for patient. Conjunctiva and EOM were normal and patient describes foreign body sensation not pain. Slit lamp revealed no foreign body or region of increased fluorescein uptake.  There are no clinical signs/sx to suggest acute closed angle glaucoma, CRAO/CRVO, retinal detachment, globe rupture, hyphema, etc. On exam, she had a small white head on the external upper lid along the lash line, indicating a likely hordeolum. Additionally with lid flip, I noted a substantial lesion on the inner side of the upper lid with a white central head as  well, indicating possible internal hordeolum. Patient should follow up with eye doctor in order to have this further evaluated. Erythromycin ointment will be started.   With respect to her concerns of her nose, patient has history of silicone injections for plastic surgery ~12 years ago in [facility], and she was concerned there was an issue with this. On exam, she had no significant swelling and no erythema to suggest skin infection or abscess. No signs to suggest preseptal cellulitis. No bony tenderness or history of injury to suggest fracture. The reported pain on side of nose may be due to lacrimal gland blockage. Encouraged her to do warm compresses 4-5 x per day to encourage drainage and healing. Instructed to follow up with eye doctor this week and provided number for plastics surgery if she wants her nose further assessed. Patient can discharge home at this time and had her questions answered.    I staffed this patient with Dr. Dutton who agrees with the above assessment and plan.      Diagnosis:    ICD-10-CM    1. Hordeolum externum left upper eyelid  H00.014       2. Lesion of left upper eyelid  H02.9     internal           Discharge Medications:  Discharge Medication List as of 5/22/2023  9:15 AM      START taking these medications    Details   erythromycin (ROMYCIN) 5 MG/GM ophthalmic ointment Place 0.5 inches into the right eye 4 times daily for 5 daysDisp-10 g, [id number]              Scribe Disclosure:  Josh HORVATH, am serving as a scribe at 7:47 AM on 5/22/2023 to document services personally performed by Romi Benson PA-C based on my observations and the provider's statements to me.    5/22/2023   Romi Benson PA-C Meier, Victoria J, PA-C  05/22/23 1400

## 2023-05-22 NOTE — ED TRIAGE NOTES
Patient presents to the ED reporting blurred vision in the right eye and pain on the bridge of her nose. States symptoms have been present for the past several weeks.